# Patient Record
Sex: FEMALE | Race: WHITE | NOT HISPANIC OR LATINO | Employment: FULL TIME | URBAN - METROPOLITAN AREA
[De-identification: names, ages, dates, MRNs, and addresses within clinical notes are randomized per-mention and may not be internally consistent; named-entity substitution may affect disease eponyms.]

---

## 2017-01-31 ENCOUNTER — ALLSCRIPTS OFFICE VISIT (OUTPATIENT)
Dept: OTHER | Facility: OTHER | Age: 27
End: 2017-01-31

## 2017-02-02 LAB
HPV 18 (HISTORICAL): NOT DETECTED
HPV HIGH RISK 16/18 (HISTORICAL): NOT DETECTED
HPV16 (HISTORICAL): NOT DETECTED
PAP (HISTORICAL): NORMAL

## 2018-01-10 NOTE — MISCELLANEOUS
Message   Recorded as Task   Date: 02/01/2016 10:55 AM, Created By: Stefani Connolly   Task Name: Follow Up   Assigned To: Duolingo File   Regarding Patient: Lori Shine, Status: In Progress   CommentSharathe Ava - 01 Feb 2016 10:55 AM     TASK CREATED  Caller: Self; Other; [phone] (Home)  spoke to pt- stated she has colp done D87  on Sat she had a fever took tylenol and is okay now but she is still feeling nauseous wnats to know if that is normal    Please advise  stated you can LM she at work   Maksim Mask - 48 Feb 2016 11:38 AM     TASK [address] - 01 Feb 2016 11:39 AM     TASK EDITED  lmtcb   Maksim Mask - 01 Feb 2016 3:38 PM     TASK EDITED  Pt had colp last Fri  On Sat, she had fever of 101 4, crampy, dizzy and nauseated  She was fine yesterday and slightly crampy today  All other sx gone  No fever  Pt will have menses in 1 week  Pt to observe and cb if sx recur - especially fever        Active Problems    1  Allergic rhinitis (477 9) (J30 9)   2  ASCUS with positive high risk HPV (796 9) (R89 6)   3  Contact dermatitis (692 9) (L25 9)   4  Encounter for birth control pills maintenance (V25 41) (Z30 41)   5  Encounter for gynecological examination without abnormal finding (V72 31) (Z01 419)   6  Need for tuberculosis vaccination (V03 2) (Z23)   7  Normal routine physical examination (V70 0) (Z00 00)   8  Obesity (278 00) (E66 9)   9  Screening for bacterial and spirochetal sexually transmitted diseases (V74 5) (Z11 3)    Current Meds   1  Fluticasone Propionate 0 05 % External Cream; APPLY AND GENTLY MASSAGE INTO   AFFECTED AREA(S) TWICE DAILY; Therapy: 99WDP5974 to (Last Rx:42Gvt1943)  Requested for: 26Zvw2887 Ordered   2  Loryna 3-0 02 MG Oral Tablet; sig 1 po daily; Therapy: 79NXF8251 to (Evaluate:23Jan2017)  Requested for: 92HSY2419; Last   Rx:29Jan2016 Ordered    Allergies    1  Advil CAPS   2   Aspirin TABS    Signatures   Electronically signed by :  Pily Love, ; Feb 1 2016  3:38PM EST                       (Author)

## 2018-01-15 VITALS
SYSTOLIC BLOOD PRESSURE: 130 MMHG | WEIGHT: 234 LBS | BODY MASS INDEX: 43.06 KG/M2 | DIASTOLIC BLOOD PRESSURE: 96 MMHG | HEIGHT: 62 IN

## 2018-01-16 NOTE — PROCEDURES
Assessment    1  ASCUS with positive high risk HPV (796 9) (R89 6)    Plan  ASCUS with positive high risk HPV    · Follow-up visit in 1 year Evaluation and Treatment  Follow-up  Status: Hold For -  Scheduling  Requested for: 76DOF3718   Ordered; For: ASCUS with positive high risk HPV; Ordered By: Yara Perry Performed:  Due: 10ZSJ0456   · Call [phone] if: You are bleeding more heavily than normal during your period, or  you are bleeding between periods ; Status:Complete;   Done: 27XRP5680 09:29AM   Ordered; For:ASCUS with positive high risk HPV; Ordered By:Dat Ingrametia Frees;   · Call [phone] if: You notice abnormal vaginal discharge ; Status:Complete;   Done:  39XLD2114 09:29AM   Ordered; For:ASCUS with positive high risk HPV; Ordered By:Dat Ingrametia Frees;   · Seek Immediate Medical Attention if: Your temperature is higher than 101F ;  Status:Complete;   Done: 94BQX0337 09:29AM   Ordered; For:ASCUS with positive high risk HPV; Ordered By:Dat Ingrametia Frees; Will call with results of biopsies to discuss future management  Return in one year for annual or prn  Active Problems    1  Allergic rhinitis (477 9) (J30 9)   2  Contact dermatitis (692 9) (L25 9)   3  Encounter for birth control pills maintenance (V25 41) (Z30 41)   4  Encounter for gynecological examination without abnormal finding (V72 31) (Z01 419)   5  Need for tuberculosis vaccination (V03 2) (Z23)   6  Normal routine physical examination (V70 0) (Z00 00)   7  Obesity (278 00) (E66 9)   8  Screening for bacterial and spirochetal sexually transmitted diseases (V74 5) (Z11 3)    Current Meds    1  Fluticasone Propionate 0 05 % External Cream; APPLY AND GENTLY MASSAGE INTO   AFFECTED AREA(S) TWICE DAILY; Therapy: 02BIC3619 to (Last Rx:47Zmv4786)  Requested for: 47Msj0467 Ordered    2  Loryna 3-0 02 MG Oral Tablet; sig 1 po daily;    Therapy: 14KJW3313 to (Last Rx:18Nov2014)  Requested for: 97OXU9023 Ordered    Allergies    1  Advil CAPS   2  Aspirin TABS    Procedure    Procedure: colposcopy  Indication: atypical squamous cells of undetermined significance and PCR positive for high risk HPV  Risks, benefits and alternatives were discussed with the patient  We discussed possible complications, including infection and bleeding  Written consent was obtained prior to the procedure  Procedure Note:   A cervical Pap smear was not performed  The squamocolumnar junction was fully visualized  After bathing the cervix in acetic acid, evaluation showed acetowhite changes at 6 o'clock and abnormal bleeding at 9 o'clock  Cervical Biopsy: 2 biopsies taken of the cervix  the biopsies were taken at 6,9 o'clock  Endocervical curettage was performed  Hemostasis was obtained with Monsel's solution and direct pressure  Patient Status: the patient tolerated the procedure well  Complications: there were no complications        Future Appointments    Date/Time Provider Specialty Site   12/19/2016 10:00 AM Pippa Apodaca MD Obstetrics/Gynecology [facility] [address] [place] [address]     Signatures   Electronically signed by : AMINTA Isbell ; Jan 29 2016  9:29AM EST                       (Author)

## 2018-01-31 DIAGNOSIS — Z30.09 BIRTH CONTROL COUNSELING: Primary | ICD-10-CM

## 2018-01-31 RX ORDER — DROSPIRENONE AND ETHINYL ESTRADIOL 0.02-3(28)
1 KIT ORAL DAILY
Qty: 90 TABLET | Refills: 0 | Status: SHIPPED | OUTPATIENT
Start: 2018-01-31 | End: 2018-03-13 | Stop reason: SDUPTHER

## 2018-03-13 ENCOUNTER — OFFICE VISIT (OUTPATIENT)
Dept: OBGYN CLINIC | Facility: CLINIC | Age: 28
End: 2018-03-13
Payer: COMMERCIAL

## 2018-03-13 VITALS
DIASTOLIC BLOOD PRESSURE: 80 MMHG | HEIGHT: 63 IN | SYSTOLIC BLOOD PRESSURE: 124 MMHG | WEIGHT: 240.2 LBS | BODY MASS INDEX: 42.56 KG/M2

## 2018-03-13 DIAGNOSIS — Z12.4 CERVICAL CANCER SCREENING: ICD-10-CM

## 2018-03-13 DIAGNOSIS — Z30.09 BIRTH CONTROL COUNSELING: ICD-10-CM

## 2018-03-13 DIAGNOSIS — Z30.41 SURVEILLANCE OF PREVIOUSLY PRESCRIBED CONTRACEPTIVE PILL: ICD-10-CM

## 2018-03-13 DIAGNOSIS — Z01.419 ENCOUNTER FOR GYNECOLOGICAL EXAMINATION (GENERAL) (ROUTINE) WITHOUT ABNORMAL FINDINGS: Primary | ICD-10-CM

## 2018-03-13 PROBLEM — B97.7 HUMAN PAPILLOMA VIRUS (HPV) INFECTION: Status: ACTIVE | Noted: 2017-01-31

## 2018-03-13 PROBLEM — J45.909 ASTHMA: Status: ACTIVE | Noted: 2017-11-27

## 2018-03-13 PROBLEM — N87.0 DYSPLASIA OF CERVIX, LOW GRADE (CIN 1): Status: ACTIVE | Noted: 2018-03-13

## 2018-03-13 PROCEDURE — S0612 ANNUAL GYNECOLOGICAL EXAMINA: HCPCS | Performed by: NURSE PRACTITIONER

## 2018-03-13 RX ORDER — MONTELUKAST SODIUM 10 MG/1
TABLET ORAL
COMMUNITY

## 2018-03-13 RX ORDER — DROSPIRENONE AND ETHINYL ESTRADIOL 0.02-3(28)
1 KIT ORAL DAILY
Qty: 90 TABLET | Refills: 3 | Status: SHIPPED | OUTPATIENT
Start: 2018-03-13 | End: 2020-02-24 | Stop reason: SDUPTHER

## 2018-03-13 NOTE — ASSESSMENT & PLAN NOTE
Pap hx is as follows:   2013: normal cyto -> 2014: ASCUS, + HR HPV (-16/18) -> 12/2015: ASCUS, + HR HPV (-16/18) ->  1/2016: colpo with LSIL/CIN1, neg dysplasia -> 1/2017 normal cyto, neg HPV  Per ASCCP guidelines, continue age approp testing (repeat cytology in 3 yrs)

## 2018-03-13 NOTE — ASSESSMENT & PLAN NOTE
Normal findings on routine gyn exam  Advised monthly SBE, annual CBE and baseline screening mammo at age 36  Reviewed ASCCP guidelines and pap noted to be up to date  STI testing was offered and the patient declines; she reports low risk, not sexually active since last visit  The patient desires to continue OCP; reviewed ACHES and reasons to call  Diet/activity recommendations  RTO in one year or sooner PRN

## 2018-03-13 NOTE — PROGRESS NOTES
Assessment/Plan:    Dysplasia of cervix, low grade (MAGGI 1)  Pap hx is as follows:   2013: normal cyto -> 2014: ASCUS, + HR HPV (-16/18) -> 12/2015: ASCUS, + HR HPV (-16/18) ->  1/2016: colpo with LSIL/CIN1, neg dysplasia -> 1/2017 normal cyto, neg HPV  Per ASCCP guidelines, continue age approp testing (repeat cytology in 3 yrs)  Encounter for gynecological examination (general) (routine) without abnormal findings  Normal findings on routine gyn exam  Advised monthly SBE, annual CBE and baseline screening mammo at age 36  Reviewed ASCCP guidelines and pap noted to be up to date  STI testing was offered and the patient declines; she reports low risk, not sexually active since last visit  The patient desires to continue OCP; reviewed ACHES and reasons to call  Diet/activity recommendations  RTO in one year or sooner PRN  Diagnoses and all orders for this visit:    Encounter for gynecological examination (general) (routine) without abnormal findings    Cervical cancer screening  -     Liquid-based pap, screening    Surveillance of previously prescribed contraceptive pill    Birth control counseling  -     drospirenone-ethinyl estradiol (JUDY) 3-0 02 MG per tablet; Take 1 tablet by mouth daily    Other orders  -     montelukast (SINGULAIR) 10 mg tablet; Take by mouth  -     VENTOLIN  (90 Base) MCG/ACT inhaler; Inhale 2 puffs every 6 (six) hours as needed for wheezing  -     Cetirizine HCl 10 MG CAPS; Take 10 mg by mouth          Subjective:      Patient ID: Luis Sultana is a 29 y o  female  This patient presents for routine annual gyn exam    She suffers from chronic sinusitis and has plans for sinus polypectomy procedure at [facility]  She continues to struggle with her weight while on/off steroid tapers  H/o abn pap - see A&P  She denies acute gyn complaints  Likes current OCP and desires to continue; denies ACHES   She denies pelvic pain, abn discharge, breast concerns, bowel/bladder dysfunction, depression/anx  Single and not sexually active  Denies STI concerns  Gynecologic Exam         The following portions of the patient's history were reviewed and updated as appropriate: allergies, current medications, past family history, past medical history, past social history, past surgical history and problem list     Review of Systems   Constitutional: Negative  HENT: Positive for congestion and sinus pressure  Negative for dental problem, ear pain and trouble swallowing  Eyes: Negative  Respiratory: Negative  Cardiovascular: Negative  Gastrointestinal: Negative  Endocrine: Negative  Genitourinary: Negative  Musculoskeletal: Negative  Skin: Negative  Allergic/Immunologic: Negative  Neurological: Negative  Hematological: Negative  Psychiatric/Behavioral: Negative  Objective:      /80 (BP Location: Right arm)   Ht 5' 3" (1 6 m)   Wt 109 kg (240 lb 3 2 oz)   LMP 03/03/2018   BMI 42 55 kg/m²          Physical Exam   Constitutional: She is oriented to person, place, and time  She appears well-developed and well-nourished  HENT:   Head: Normocephalic and atraumatic  Eyes: EOM are normal  Pupils are equal, round, and reactive to light  Neck: Normal range of motion  Neck supple  No thyromegaly present  Cardiovascular: Normal rate, regular rhythm and normal heart sounds  Pulmonary/Chest: Effort normal and breath sounds normal  No respiratory distress  She has no wheezes  She has no rales  She exhibits no mass, no tenderness and no deformity  Right breast exhibits no inverted nipple, no mass, no nipple discharge, no skin change and no tenderness  Left breast exhibits no inverted nipple, no mass, no nipple discharge, no skin change and no tenderness  Breasts are symmetrical    Abdominal: Soft  She exhibits no distension and no mass  There is no splenomegaly or hepatomegaly  There is no tenderness  There is no rebound and no guarding  Genitourinary: Rectum normal, vagina normal and uterus normal  No breast swelling, tenderness or discharge  No labial fusion  There is no rash, tenderness, lesion or injury on the right labia  There is no rash, tenderness, lesion or injury on the left labia  Cervix exhibits no motion tenderness, no discharge and no friability  Right adnexum displays no mass, no tenderness and no fullness  Left adnexum displays no mass, no tenderness and no fullness  No erythema, tenderness or bleeding in the vagina  No foreign body in the vagina  No vaginal discharge found  Musculoskeletal: Normal range of motion  Lymphadenopathy:     She has no cervical adenopathy  She has no axillary adenopathy  Neurological: She is alert and oriented to person, place, and time  No cranial nerve deficit  Skin: Skin is warm and dry  No rash noted  No cyanosis  Nails show no clubbing  Psychiatric: She has a normal mood and affect   Her speech is normal and behavior is normal  Judgment and thought content normal  Cognition and memory are normal

## 2018-03-15 ENCOUNTER — TELEPHONE (OUTPATIENT)
Dept: OBGYN CLINIC | Facility: CLINIC | Age: 28
End: 2018-03-15

## 2019-01-03 RX ORDER — PREDNISONE 10 MG/1
TABLET ORAL
Refills: 0 | COMMUNITY
Start: 2018-12-13 | End: 2019-01-08 | Stop reason: ALTCHOICE

## 2019-01-03 RX ORDER — AMOXICILLIN AND CLAVULANATE POTASSIUM 875; 125 MG/1; MG/1
TABLET, FILM COATED ORAL
Refills: 0 | COMMUNITY
Start: 2018-12-13 | End: 2019-01-08 | Stop reason: ALTCHOICE

## 2019-01-03 RX ORDER — SUCRALFATE 1 G/1
1 TABLET ORAL EVERY 12 HOURS
Refills: 1 | COMMUNITY
Start: 2018-10-24 | End: 2019-01-08 | Stop reason: ALTCHOICE

## 2019-01-03 RX ORDER — FLUTICASONE FUROATE AND VILANTEROL TRIFENATATE 100; 25 UG/1; UG/1
1 POWDER RESPIRATORY (INHALATION) DAILY
Refills: 3 | COMMUNITY
Start: 2018-10-09 | End: 2022-08-09 | Stop reason: ALTCHOICE

## 2019-01-08 ENCOUNTER — APPOINTMENT (OUTPATIENT)
Dept: LAB | Facility: HOSPITAL | Age: 29
End: 2019-01-08
Payer: COMMERCIAL

## 2019-01-08 ENCOUNTER — OFFICE VISIT (OUTPATIENT)
Dept: BARIATRICS | Facility: CLINIC | Age: 29
End: 2019-01-08
Payer: COMMERCIAL

## 2019-01-08 VITALS
TEMPERATURE: 98.4 F | BODY MASS INDEX: 43.91 KG/M2 | RESPIRATION RATE: 18 BRPM | DIASTOLIC BLOOD PRESSURE: 74 MMHG | WEIGHT: 238.6 LBS | HEIGHT: 62 IN | HEART RATE: 84 BPM | SYSTOLIC BLOOD PRESSURE: 116 MMHG

## 2019-01-08 DIAGNOSIS — E66.01 OBESITY, CLASS III, BMI 40-49.9 (MORBID OBESITY) (HCC): ICD-10-CM

## 2019-01-08 DIAGNOSIS — R63.5 ABNORMAL WEIGHT GAIN: ICD-10-CM

## 2019-01-08 DIAGNOSIS — J45.909 ASTHMA, UNSPECIFIED ASTHMA SEVERITY, UNSPECIFIED WHETHER COMPLICATED, UNSPECIFIED WHETHER PERSISTENT: Primary | ICD-10-CM

## 2019-01-08 LAB
ALBUMIN SERPL BCP-MCNC: 3.1 G/DL (ref 3.5–5)
ALP SERPL-CCNC: 58 U/L (ref 46–116)
ALT SERPL W P-5'-P-CCNC: 14 U/L (ref 12–78)
ANION GAP SERPL CALCULATED.3IONS-SCNC: 11 MMOL/L (ref 4–13)
AST SERPL W P-5'-P-CCNC: 10 U/L (ref 5–45)
BILIRUB SERPL-MCNC: 0.2 MG/DL (ref 0.2–1)
BUN SERPL-MCNC: 12 MG/DL (ref 5–25)
CALCIUM SERPL-MCNC: 9.1 MG/DL (ref 8.3–10.1)
CHLORIDE SERPL-SCNC: 103 MMOL/L (ref 100–108)
CHOLEST SERPL-MCNC: 201 MG/DL (ref 50–200)
CO2 SERPL-SCNC: 27 MMOL/L (ref 21–32)
CREAT SERPL-MCNC: 0.9 MG/DL (ref 0.6–1.3)
EST. AVERAGE GLUCOSE BLD GHB EST-MCNC: 114 MG/DL
GFR SERPL CREATININE-BSD FRML MDRD: 87 ML/MIN/1.73SQ M
GLUCOSE P FAST SERPL-MCNC: 91 MG/DL (ref 65–99)
HBA1C MFR BLD: 5.6 % (ref 4.2–6.3)
HDLC SERPL-MCNC: 102 MG/DL (ref 40–60)
INSULIN SERPL-ACNC: 18.2 MU/L (ref 3–25)
LDLC SERPL CALC-MCNC: 75 MG/DL (ref 0–100)
NONHDLC SERPL-MCNC: 99 MG/DL
POTASSIUM SERPL-SCNC: 4.1 MMOL/L (ref 3.5–5.3)
PROT SERPL-MCNC: 7.5 G/DL (ref 6.4–8.2)
SODIUM SERPL-SCNC: 141 MMOL/L (ref 136–145)
TRIGL SERPL-MCNC: 121 MG/DL
TSH SERPL DL<=0.05 MIU/L-ACNC: 1.32 UIU/ML (ref 0.36–3.74)

## 2019-01-08 PROCEDURE — 83036 HEMOGLOBIN GLYCOSYLATED A1C: CPT

## 2019-01-08 PROCEDURE — 80053 COMPREHEN METABOLIC PANEL: CPT

## 2019-01-08 PROCEDURE — 36415 COLL VENOUS BLD VENIPUNCTURE: CPT

## 2019-01-08 PROCEDURE — 80061 LIPID PANEL: CPT

## 2019-01-08 PROCEDURE — 83525 ASSAY OF INSULIN: CPT

## 2019-01-08 PROCEDURE — 99203 OFFICE O/P NEW LOW 30 MIN: CPT | Performed by: PHYSICIAN ASSISTANT

## 2019-01-08 PROCEDURE — 84443 ASSAY THYROID STIM HORMONE: CPT

## 2019-01-08 RX ORDER — BUDESONIDE 0.5 MG/2ML
0.5 INHALANT ORAL
COMMUNITY
Start: 2018-09-19 | End: 2022-08-09 | Stop reason: ALTCHOICE

## 2019-01-08 RX ORDER — ASPIRIN 325 MG
650 TABLET ORAL
COMMUNITY

## 2019-01-08 NOTE — ASSESSMENT & PLAN NOTE
-Discussed options of HealthyCORE-Intensive Lifestyle Intervention Program, Very Low Calorie Diet-VLCD, Conservative Program, Ham-En-Y Gastric Bypass and Vertical Sleeve Gastrectomy and the role of weight loss medications   -Initial weight loss goal of 5-10% weight loss for improved health  -Screening labs  Recommend checking lab coverage before having labs drawn   -cmp reviewed from 8/15/18 all within acceptable limits   Needs tsh, lipid, a1c and fasting insulin   - STOP BANG-1/8  -Patient is interested in pursuing Very Low Calorie Diet-VLCD    VLCD Review:  Labs ordered: cmp ordered   HTN meds addressed: n/a  DM2 meds addressed: n/a  VLCD time restriction based on BMI: no

## 2019-01-08 NOTE — PROGRESS NOTES
Assessment/Plan:    Asthma  Taking breo ellipta, ventolin  and pulmicort   -continue management with prescribing provider    Obesity, Class III, BMI 40-49 9 (morbid obesity) ([facility] 75 )  -Discussed options of HealthyCORE-Intensive Lifestyle Intervention Program, Very Low Calorie Diet-VLCD, Conservative Program, Ham-En-Y Gastric Bypass and Vertical Sleeve Gastrectomy and the role of weight loss medications   -Initial weight loss goal of 5-10% weight loss for improved health  -Screening labs  Recommend checking lab coverage before having labs drawn   -cmp reviewed from 8/15/18 all within acceptable limits  Needs tsh, lipid, a1c and fasting insulin   - STOP BANG-1/8  -Patient is interested in pursuing Very Low Calorie Diet-VLCD    VLCD Review:  Labs ordered: cmp ordered   HTN meds addressed: n/a  DM2 meds addressed: n/a  VLCD time restriction based on BMI: no      Follow up vlcd      Diagnoses and all orders for this visit:    Asthma, unspecified asthma severity, unspecified whether complicated, unspecified whether persistent    Obesity, Class III, BMI 40-49 9 (morbid obesity) ([facility] )  -     Lipid panel; Future  -     TSH, 3rd generation with Free T4 reflex; Future  -     Insulin, fasting; Future  -     Hemoglobin A1C; Future  -     Comprehensive metabolic panel; Future    Abnormal weight gain  -     Lipid panel; Future  -     TSH, 3rd generation with Free T4 reflex; Future  -     Insulin, fasting; Future  -     Hemoglobin A1C; Future  -     Comprehensive metabolic panel; Future    Other orders  -     Discontinue: amoxicillin-clavulanate (AUGMENTIN) 875-125 mg per tablet; TAKE 1 TABLET BY MOUTH EVERY 12 HOURS FOR 14 DAYS  TAKE WITH FOOD  -     BREO ELLIPTA 100-25 MCG/INH inhaler; Inhale 1 puff daily  -     Discontinue: predniSONE 10 mg tablet; TAKE 4 TABS X 3 DAYS, THEN 3 TABS X 3 DAYS, THEN 2 TABS X 3 DAYS, THEN 1 TAB EVERYDAY X 3 DAYS  -     Discontinue: sucralfate (CARAFATE) 1 g tablet;  Take 1 g by mouth every 12 (twelve) hours  -     aspirin 325 mg tablet; Take 650 mg by mouth  -     budesonide (PULMICORT) 0 5 mg/2 mL nebulizer solution; Inhale 0 5 mg          Subjective:   Chief Complaint   Patient presents with    Consult     Pt here for MWM consult  Patient ID: Odell Harris  is a 29 y o  female with excess weight/obesity here to pursue weight management  Past Medical History:   Diagnosis Date    Atypical squamous cells of undetermined significance (ASCUS) on Papanicolaou smear of cervix     HPV (human papilloma virus) infection        HPI:  Obesity/Excess Weight:  Severity: Severe  Onset:  Since jarad high/high school   Modifiers: Diet and Exercise, Commercial Weight Loss Programs-ie  Weight Watchers, [employer] Nutrisystem, etc  and Prescription Weight Loss Medications-- phentermine and topamax but didn't feel well on the medications   Contributing factors: Poor Food Choices and Medications-steroids   Associated symptoms: increased joint pain and increased shortness of breath    Goals: under 200lbs, 170-180 lbs   Hydration: 2 liters of seltzer water   Alcohol: none    The following portions of the patient's history were reviewed and updated as appropriate: allergies, current medications, past family history, past medical history, past social history, past surgical history and problem list     Review of Systems   HENT: Positive for sore throat (uri)  Respiratory: Positive for cough (uri) and shortness of breath (with exertion)  Cardiovascular: Negative for chest pain and palpitations  Gastrointestinal: Negative for abdominal pain, constipation, diarrhea, nausea and vomiting  Denies GERD   Endocrine: Positive for heat intolerance  Negative for cold intolerance  Genitourinary: Negative for dysuria  Musculoskeletal: Positive for arthralgias (knees) and back pain  Skin: Negative for rash  Neurological: Negative for headaches     Psychiatric/Behavioral: Negative for suicidal ideas (denies HI)         Denies Depression or anxiety       Objective:    /74 (BP Location: Right arm, Patient Position: Sitting, Cuff Size: Large)   Pulse 84   Temp 98 4 °F (36 9 °C) (Tympanic)   Resp 18   Ht 5' 1 5" (1 562 m)   Wt 108 kg (238 lb 9 6 oz)   BMI 44 35 kg/m²     Physical Exam   Nursing note and vitals reviewed  Constitutional   General appearance: Abnormal   well developed and morbidly obese  Eyes No conjunctival pallor  Ears, Nose, Mouth, and Throat Oral mucosa moist    Pulmonary   Respiratory effort: No increased work of breathing or signs of respiratory distress  Auscultation of lungs: Clear to auscultation, equal breath sounds bilaterally, + wheezes bilaterally, no rales, no rhonci  Cardiovascular   Auscultation of heart: Normal rate and rhythm, normal S1 and S2, without murmurs  Examination of extremities for edema and/or varicosities: Normal   no edema  Abdomen   Abdomen: Abnormal   The abdomen was obese  Bowel sounds were normal  The abdomen was soft and nontender     Musculoskeletal   Gait and station: Normal     Psychiatric   Orientation to person, place and time: Normal     Affect: appropriate

## 2019-01-09 ENCOUNTER — TELEPHONE (OUTPATIENT)
Dept: BARIATRICS | Facility: CLINIC | Age: 29
End: 2019-01-09

## 2019-01-09 NOTE — TELEPHONE ENCOUNTER
----- Message from Silas Sanders PA-C sent at 1/9/2019  8:17 AM EST -----  Please call patient and inform her that her fasting insulin and her A1c is within acceptable range  Ok to proceed with vlcd     Result note: A1C and fasting insulin within normal limits

## 2019-01-09 NOTE — TELEPHONE ENCOUNTER
I telephoned patient and informed her that all her labs are within normal limits, except for her cholesterol (which was elevated), and that RD Rice indicates it is okay for her to proceed with the VLCD  Patient indicated understanding and thanked me

## 2019-01-15 ENCOUNTER — OFFICE VISIT (OUTPATIENT)
Dept: BARIATRICS | Facility: CLINIC | Age: 29
End: 2019-01-15

## 2019-01-15 DIAGNOSIS — R63.5 ABNORMAL WEIGHT GAIN: ICD-10-CM

## 2019-01-15 PROCEDURE — VLCD

## 2019-01-15 PROCEDURE — RECHECK

## 2019-01-18 ENCOUNTER — TELEPHONE (OUTPATIENT)
Dept: BARIATRICS | Facility: CLINIC | Age: 29
End: 2019-01-18

## 2019-01-29 ENCOUNTER — APPOINTMENT (OUTPATIENT)
Dept: LAB | Facility: HOSPITAL | Age: 29
End: 2019-01-29
Payer: COMMERCIAL

## 2019-01-29 ENCOUNTER — TELEPHONE (OUTPATIENT)
Dept: BARIATRICS | Facility: CLINIC | Age: 29
End: 2019-01-29

## 2019-01-29 ENCOUNTER — OFFICE VISIT (OUTPATIENT)
Dept: BARIATRICS | Facility: CLINIC | Age: 29
End: 2019-01-29

## 2019-01-29 VITALS
BODY MASS INDEX: 43.39 KG/M2 | SYSTOLIC BLOOD PRESSURE: 124 MMHG | DIASTOLIC BLOOD PRESSURE: 80 MMHG | HEIGHT: 62 IN | WEIGHT: 235.8 LBS

## 2019-01-29 DIAGNOSIS — R63.5 ABNORMAL WEIGHT GAIN: ICD-10-CM

## 2019-01-29 DIAGNOSIS — R63.5 ABNORMAL WEIGHT GAIN: Primary | ICD-10-CM

## 2019-01-29 LAB
ANION GAP SERPL CALCULATED.3IONS-SCNC: 11 MMOL/L (ref 4–13)
BUN SERPL-MCNC: 11 MG/DL (ref 5–25)
CALCIUM SERPL-MCNC: 9.1 MG/DL (ref 8.3–10.1)
CHLORIDE SERPL-SCNC: 106 MMOL/L (ref 100–108)
CO2 SERPL-SCNC: 27 MMOL/L (ref 21–32)
CREAT SERPL-MCNC: 0.82 MG/DL (ref 0.6–1.3)
GFR SERPL CREATININE-BSD FRML MDRD: 98 ML/MIN/1.73SQ M
GLUCOSE P FAST SERPL-MCNC: 93 MG/DL (ref 65–99)
MAGNESIUM SERPL-MCNC: 2.1 MG/DL (ref 1.6–2.6)
POTASSIUM SERPL-SCNC: 4 MMOL/L (ref 3.5–5.3)
SODIUM SERPL-SCNC: 144 MMOL/L (ref 136–145)

## 2019-01-29 PROCEDURE — 83735 ASSAY OF MAGNESIUM: CPT

## 2019-01-29 PROCEDURE — 36415 COLL VENOUS BLD VENIPUNCTURE: CPT

## 2019-01-29 PROCEDURE — 80048 BASIC METABOLIC PNL TOTAL CA: CPT

## 2019-01-29 PROCEDURE — RECHECK

## 2019-01-29 PROCEDURE — VLCD

## 2019-01-29 NOTE — PROGRESS NOTES
Weight Management Medical Nutrition Assessment  Jose Antonio Mast presented today for her 2 wk f/u on VLCD  Today she weighs 235 8 giving her a loss of 8 3 lbs in the last 2 wks  She reports that she is drinking her 80 oz (sometimes more) of water  She has no constipation and feels great  She went to [facility] for the day and had a shake and a bar - she did have an emergency meal for dinner, but ordered grill chicken on a salad and use oil and vinegar dressing  Overall she is doing very well and plans to continue on VLCD  Labs were ordered and BP taken  Anthropometric Measurements  Start Weight (lbs): 244 1 lbs  Current Weight (lbs): 235 8  TBW % Change from start weight: 1%  Ideal Body Weight (lbs): 107 5 lbs  Goal Weight (lbs):  200 lbs or less    Weight Loss History  Previous weight loss attempts: Self Created Diets (Portion Control, Healthy Food Choices, etc )    Food and Nutrition Related History    Food Recall  Breakfast: Shake   Snack: none  Lunch; pudding   Snack: bar  Dinner: shake   Snack:  Egg beaters       Beverages: water  Volume of beverage intake: 80 oz    Weekends: Same  Cravings: none currently  Trouble area of day:na    Frequency of Eating out: irregularly  Food restrictions:carbs while on VLCD  Cooking: self   Food Shopping: self    Physical Activity Intake  Activity:none  Frequency:none  Physical limitations/barriers to exercise: none    Estimated Needs  Energy    [employer] Energy Needs: BMR : 7302  1-2# loss weekly sedentary:   1094 - 1594        1-2# loss weekly lightly active:  1399 - 1899  Protein: 58 - 73   (1 2-1 5g/kg IBW)  Fluid:   57 oz   (35mL/kg IBW)    Nutrition Diagnosis  Yes; Overweight/obesity  related to Excess energy intake as evidenced by  BMI more than normative standard for age and sex (obesity-grade II 35-39  9)       Nutrition Intervention    Nutrition Prescription  Calories:  800 calories  Protein:  96 gr  Fluid:  80 oz    Meal Plan  Breakfast: Shake  Snack:  Lunch:  pudding  Snack: bar  Dinner:  Shake or pudding  Snack:    Nutrition Education:    VLCD guide lines  Calorie controlled menu  Lean protein food choices  Healthy snack options        Nutrition Counseling:  Strategies: meal planning, portion sizes, healthy snack choices, hydration, fiber intake, protein intake, exercise, food journal      Monitoring and Evaluation:  Evaluation criteria:  Energy Intake  Meet protein needs  Maintain adequate hydration  Monitor weekly weight    Barriers to learning:none  Readiness to change: Action  Comprehension: good  Expected Compliance: good

## 2019-01-29 NOTE — TELEPHONE ENCOUNTER
Relayed information to patient    She expressed understanding and will proceed w/VLCD       ----- Message from Ana Luisa Jarvis PA-C sent at 1/29/2019 11:49 AM EST -----  Please call patient and inform her that her bmp and magnesium are within normal limits ok to proceed with vlcd    Result note: bmp and magnesium within acceptable limits

## 2019-02-12 ENCOUNTER — OFFICE VISIT (OUTPATIENT)
Dept: BARIATRICS | Facility: CLINIC | Age: 29
End: 2019-02-12

## 2019-02-12 ENCOUNTER — APPOINTMENT (OUTPATIENT)
Dept: LAB | Facility: HOSPITAL | Age: 29
End: 2019-02-12
Payer: COMMERCIAL

## 2019-02-12 VITALS
DIASTOLIC BLOOD PRESSURE: 74 MMHG | SYSTOLIC BLOOD PRESSURE: 116 MMHG | HEIGHT: 62 IN | WEIGHT: 227.7 LBS | BODY MASS INDEX: 41.9 KG/M2

## 2019-02-12 DIAGNOSIS — R63.5 ABNORMAL WEIGHT GAIN: ICD-10-CM

## 2019-02-12 DIAGNOSIS — R63.5 ABNORMAL WEIGHT GAIN: Primary | ICD-10-CM

## 2019-02-12 LAB
ANION GAP SERPL CALCULATED.3IONS-SCNC: 9 MMOL/L (ref 4–13)
BUN SERPL-MCNC: 16 MG/DL (ref 5–25)
CALCIUM SERPL-MCNC: 9.6 MG/DL (ref 8.3–10.1)
CHLORIDE SERPL-SCNC: 104 MMOL/L (ref 100–108)
CO2 SERPL-SCNC: 26 MMOL/L (ref 21–32)
CREAT SERPL-MCNC: 0.97 MG/DL (ref 0.6–1.3)
GFR SERPL CREATININE-BSD FRML MDRD: 80 ML/MIN/1.73SQ M
GLUCOSE P FAST SERPL-MCNC: 98 MG/DL (ref 65–99)
MAGNESIUM SERPL-MCNC: 2 MG/DL (ref 1.6–2.6)
POTASSIUM SERPL-SCNC: 4.6 MMOL/L (ref 3.5–5.3)
SODIUM SERPL-SCNC: 139 MMOL/L (ref 136–145)

## 2019-02-12 PROCEDURE — RECHECK

## 2019-02-12 PROCEDURE — 80048 BASIC METABOLIC PNL TOTAL CA: CPT

## 2019-02-12 PROCEDURE — 83735 ASSAY OF MAGNESIUM: CPT

## 2019-02-12 PROCEDURE — 36415 COLL VENOUS BLD VENIPUNCTURE: CPT

## 2019-02-12 PROCEDURE — VLCD

## 2019-02-12 NOTE — PROGRESS NOTES
Weight Management Medical Nutrition Assessment  Candelaria presented today for her 4 wk f/u on VLCD  Today she weighs 227 6 lbs giving her a loss of 8 6 lbs in the last 2 wks  She continues to drink her 80 oz (sometimes more) of water  She reports that she continues to feel but is experiencing a little constipation  reviewed fiber supplements and she did purchase some meal replacements that have fiber  She did have one emergency meal, which was 4 oz of steak and steamed broccoli  She did not get her labs done yet  Stressed the importance of the labs and  following the program   She plans to go get them this morning after this appointments  BP taken - 116/74      Anthropometric Measurements  Start Weight (lbs): 244 1 lbs  Current Weight (lbs): 226 6 lbs  TBW % Change from start weight: 5%  Ideal Body Weight (lbs): 107 5 lbs  Goal Weight (lbs):  200 lbs or less     Weight Loss History  Previous weight loss attempts: Self Created Diets (Portion Control, Healthy Food Choices, etc )     Food and Nutrition Related History     Food Recall  Breakfast: Shake          Snack: none  Lunch; pudding   Snack: bar  Dinner: shake   Snack:  Egg beaters or hard boiled eggs        Beverages: water  Volume of beverage intake: 80 oz     Weekends: Same  Cravings: none currently  Trouble area of day:na     Frequency of Eating out: irregularly  Food restrictions:carbs while on VLCD  Cooking: self   Food Shopping: self     Physical Activity Intake  Activity:none  Frequency:none  Physical limitations/barriers to exercise: none     Estimated Needs  Energy     [employer] [place] Energy Needs: BMR : 4264  1-2# loss weekly sedentary:   5592 - 1777      1-2# loss weekly lightly active:  0706 - 4488  Protein: 58 - 73   (1 2-1 5g/kg IBW)  Fluid:   57 oz   (35mL/kg IBW)     Nutrition Diagnosis  Yes;     Overweight/obesity  related to Excess energy intake as evidenced by  BMI more than normative standard for age and sex (obesity-grade II 35-39  9)     Nutrition Intervention     Nutrition Prescription  Calories:  800 calories  Protein:  96 gr  Fluid:  80 oz     Meal Plan  Breakfast:  Shake  Snack:  Lunch:  pudding  Snack: bar  Dinner:  Shake or pudding  Snack:     Nutrition Education:    VLCD guide lines  Calorie controlled menu  Lean protein food choices  Healthy snack options           Nutrition Counseling:  Strategies: meal planning, portion sizes, healthy snack choices, hydration, fiber intake, protein intake, exercise, food journal        Monitoring and Evaluation:  Evaluation criteria:  Energy Intake  Meet protein needs  Maintain adequate hydration  Monitor weekly weight     Barriers to learning:none  Readiness to change: Action  Comprehension: good  Expected Compliance: good

## 2019-02-14 ENCOUNTER — TELEPHONE (OUTPATIENT)
Dept: BARIATRICS | Facility: CLINIC | Age: 29
End: 2019-02-14

## 2019-02-14 NOTE — TELEPHONE ENCOUNTER
I left a brief message on patient's voice mail that her lab results were within normal limits and she could continue w/VLCD       ----- Message from Ana Luisa Jarvis PA-C sent at 2/13/2019  8:38 AM EST -----  Please call cesar and inform her that her bmp and magnesium are within normal values   Ok to continue with vlcd    Result note: magnesium and bmp within normal range

## 2019-02-26 ENCOUNTER — OFFICE VISIT (OUTPATIENT)
Dept: BARIATRICS | Facility: CLINIC | Age: 29
End: 2019-02-26

## 2019-02-26 VITALS
BODY MASS INDEX: 41.44 KG/M2 | WEIGHT: 225.2 LBS | HEIGHT: 62 IN | DIASTOLIC BLOOD PRESSURE: 78 MMHG | SYSTOLIC BLOOD PRESSURE: 128 MMHG

## 2019-02-26 DIAGNOSIS — R63.5 ABNORMAL WEIGHT GAIN: ICD-10-CM

## 2019-02-26 PROCEDURE — VLCD

## 2019-02-26 PROCEDURE — RECHECK

## 2019-02-26 NOTE — PROGRESS NOTES
Weight Management Medical Nutrition Assessment  Autumn Monzon presented today for her 6 wk f/u on VLCD   Today she weighs 225 2 lbs giving her a loss of 2 4 lbs in the last 2 wks  She continues to drink her 80 oz (sometimes more) of water   She admits that this time she did go off VLCD a little and did eat some real food  She plans to switch to a partial meal plan moving forward and will most likely purchase 6 RD visit Bundle  She feels she needs to stay accountable by coming every 2 - 3 weeks  Anthropometric Measurements  Start Weight (lbs): 244 1 lbs  Current Weight (lbs): 225 2lbs  TBW % Change from start weight: 6%  Ideal Body Weight (lbs): 107 5 lbs  Goal Weight (lbs):  200 lbs or less     Weight Loss History  Previous weight loss attempts: Self Created Diets (Portion Control, Healthy Food Choices, etc )     Food and Nutrition Related History     Food Recall  Breakfast: MSUKS          Snack: none  Lunch; pudding   Snack: bar  Dinner: salad and steak   Snack:  Egg beaters or hard boiled eggs        Beverages: water  Volume of beverage intake: 80 oz     Weekends: Same  Cravings: none currently  Trouble area of day:na     Frequency of Eating out: irregularly  Food restrictions:carbs while on VLCD  Cooking: self   Food Shopping: self     Physical Activity Intake  Activity:none  Frequency:none  Physical limitations/barriers to exercise: none     Estimated Needs  Energy     [employer] Energy Needs: BMR : 2689  8-6# loss weekly sedentary:   8881- 4494      6-7# loss weekly lightly active:  1793 - 0654  Protein: 58 - 73   (1 2-1 5g/kg IBW)  Fluid:   57 oz   (35mL/kg IBW)     Nutrition Diagnosis  Yes;    Overweight/obesity  related to Excess energy intake as evidenced by  BMI more than normative standard for age and sex (obesity-grade II 35-39  9)     Nutrition Intervention     Nutrition Prescription  Calories:  1000 calories  Protein:  68 gr  Fluid:  80 oz     Meal Plan  Breakfast:  Shake  Snack:  Lunch:  pudding  Snack: bar  Dinner:  Sensible dinner   Snack: low carb snack     Nutrition Education:    VLCD guide lines  Calorie controlled menu  Lean protein food choices  Healthy snack options           Nutrition Counseling:  Strategies: meal planning, portion sizes, healthy snack choices, hydration, fiber intake, protein intake, exercise, food journal        Monitoring and Evaluation:  Evaluation criteria:  Energy Intake  Meet protein needs  Maintain adequate hydration  Monitor weekly weight     Barriers to learning:none  Readiness to change: Action  Comprehension: good  Expected Compliance: good

## 2019-03-12 ENCOUNTER — OFFICE VISIT (OUTPATIENT)
Dept: BARIATRICS | Facility: CLINIC | Age: 29
End: 2019-03-12

## 2019-03-12 VITALS — BODY MASS INDEX: 41.26 KG/M2 | HEIGHT: 62 IN | WEIGHT: 224.2 LBS

## 2019-03-12 DIAGNOSIS — R63.5 ABNORMAL WEIGHT GAIN: ICD-10-CM

## 2019-03-12 PROCEDURE — RECHECK

## 2019-03-12 PROCEDURE — DB3PK

## 2019-03-19 ENCOUNTER — ANNUAL EXAM (OUTPATIENT)
Dept: OBGYN CLINIC | Facility: CLINIC | Age: 29
End: 2019-03-19
Payer: COMMERCIAL

## 2019-03-19 VITALS — SYSTOLIC BLOOD PRESSURE: 128 MMHG | DIASTOLIC BLOOD PRESSURE: 72 MMHG | WEIGHT: 222 LBS | BODY MASS INDEX: 41.27 KG/M2

## 2019-03-19 DIAGNOSIS — Z30.41 SURVEILLANCE OF PREVIOUSLY PRESCRIBED CONTRACEPTIVE PILL: ICD-10-CM

## 2019-03-19 DIAGNOSIS — Z01.419 ENCOUNTER FOR GYNECOLOGICAL EXAMINATION (GENERAL) (ROUTINE) WITHOUT ABNORMAL FINDINGS: Primary | ICD-10-CM

## 2019-03-19 PROCEDURE — S0612 ANNUAL GYNECOLOGICAL EXAMINA: HCPCS | Performed by: NURSE PRACTITIONER

## 2019-03-19 NOTE — ASSESSMENT & PLAN NOTE
Normal findings on routine gyn exam  Advised monthly SBE, annual CBE and baseline screening mammo at age 36  Reviewed ASCCP guidelines and recommended pap with cotesting at age 27  STI testing was offered and the patient declines; she reports low risk  The patient desires to continue current contraceptive method (OCP)  Diet/activity recommendations reviewed  RTO in one year or sooner PRN

## 2019-03-19 NOTE — ASSESSMENT & PLAN NOTE
Stable at this time  Advised the patient that she may call if steroid therapy is required in the future, as she is prone to candida

## 2019-03-25 NOTE — PROGRESS NOTES
Assessment/Plan:    Encounter for gynecological examination (general) (routine) without abnormal findings  Normal findings on routine gyn exam  Advised monthly SBE, annual CBE and baseline screening mammo at age 36  Reviewed ASCCP guidelines and recommended pap with cotesting at age 27  STI testing was offered and the patient declines; she reports low risk  The patient desires to continue current contraceptive method (OCP)  Diet/activity recommendations reviewed  RTO in one year or sooner PRN  Surveillance of previously prescribed contraceptive pill  The patient likes current OCP  She denies ACHES and desires to continue  She is aware condoms are advised with all sexual contact for prevention of STI  Refill provided  Reviewed reasons to call  Chronic ethmoidal sinusitis  Stable at this time  Advised the patient that she may call if steroid therapy is required in the future, as she is prone to candida  Diagnoses and all orders for this visit:    Surveillance of previously prescribed contraceptive pill    Encounter for gynecological examination (general) (routine) without abnormal findings          Subjective:      Patient ID: Rika Gonzalez is a 34 y o  female  This patient presents for routine annual gyn exam    She denies acute gyn complaints  Menses are regular and light to mod  Likes OCP, denies ACHES and desires to continue  She denies pelvic pain, breast concerns, abnormal discharge, bowel/bladder dysfunction, depression/anxiety  Monogamous  She denies STI concerns  The following portions of the patient's history were reviewed and updated as appropriate: allergies, current medications, past family history, past medical history, past social history, past surgical history and problem list     Review of Systems   Constitutional: Negative  HENT: Negative  Eyes: Negative  Respiratory: Negative  Cardiovascular: Negative  Gastrointestinal: Negative      Endocrine: Negative  Genitourinary: Negative  Musculoskeletal: Negative  Skin: Negative  Allergic/Immunologic: Negative  Neurological: Negative  Hematological: Negative  Psychiatric/Behavioral: Negative  Objective:      /72   Wt 101 kg (222 lb)   LMP 02/24/2019   BMI 41 27 kg/m²          Physical Exam   Constitutional: She is oriented to person, place, and time  She appears well-developed and well-nourished  HENT:   Head: Normocephalic and atraumatic  Eyes: Pupils are equal, round, and reactive to light  EOM are normal    Neck: Normal range of motion  Neck supple  No thyromegaly present  Cardiovascular: Normal rate, regular rhythm and normal heart sounds  Pulmonary/Chest: Effort normal and breath sounds normal  No respiratory distress  She has no wheezes  She has no rales  She exhibits no mass, no tenderness and no deformity  Right breast exhibits no inverted nipple, no mass, no nipple discharge, no skin change and no tenderness  Left breast exhibits no inverted nipple, no mass, no nipple discharge, no skin change and no tenderness  No breast tenderness or discharge  Breasts are symmetrical    Abdominal: Soft  She exhibits no distension and no mass  There is no splenomegaly or hepatomegaly  There is no tenderness  There is no rebound and no guarding  Genitourinary: Rectum normal, vagina normal and uterus normal  No breast tenderness or discharge  No labial fusion  There is no rash, tenderness, lesion or injury on the right labia  There is no rash, tenderness, lesion or injury on the left labia  Cervix exhibits no motion tenderness, no discharge and no friability  Right adnexum displays no mass, no tenderness and no fullness  Left adnexum displays no mass, no tenderness and no fullness  No erythema, tenderness or bleeding in the vagina  No foreign body in the vagina  No vaginal discharge found  Musculoskeletal: Normal range of motion     Lymphadenopathy:     She has no cervical adenopathy  She has no axillary adenopathy  Neurological: She is alert and oriented to person, place, and time  No cranial nerve deficit  Skin: Skin is warm and dry  No rash noted  No cyanosis  Nails show no clubbing  Psychiatric: She has a normal mood and affect   Her speech is normal and behavior is normal  Judgment and thought content normal  Cognition and memory are normal

## 2019-03-27 ENCOUNTER — TELEPHONE (OUTPATIENT)
Dept: OBGYN CLINIC | Facility: CLINIC | Age: 29
End: 2019-03-27

## 2019-03-27 DIAGNOSIS — Z30.09 BIRTH CONTROL COUNSELING: ICD-10-CM

## 2019-04-09 ENCOUNTER — OFFICE VISIT (OUTPATIENT)
Dept: BARIATRICS | Facility: CLINIC | Age: 29
End: 2019-04-09

## 2019-04-09 VITALS — HEIGHT: 62 IN | BODY MASS INDEX: 41.27 KG/M2

## 2019-04-09 DIAGNOSIS — R63.5 ABNORMAL WEIGHT GAIN: Primary | ICD-10-CM

## 2019-04-09 PROCEDURE — RECHECK

## 2019-05-07 ENCOUNTER — OFFICE VISIT (OUTPATIENT)
Dept: BARIATRICS | Facility: CLINIC | Age: 29
End: 2019-05-07

## 2019-05-07 VITALS — HEIGHT: 62 IN | WEIGHT: 218.4 LBS | BODY MASS INDEX: 40.19 KG/M2

## 2019-05-07 DIAGNOSIS — R63.5 ABNORMAL WEIGHT GAIN: Primary | ICD-10-CM

## 2019-05-07 PROCEDURE — RECHECK

## 2019-06-11 ENCOUNTER — OFFICE VISIT (OUTPATIENT)
Dept: BARIATRICS | Facility: CLINIC | Age: 29
End: 2019-06-11

## 2019-06-11 VITALS — HEIGHT: 62 IN | WEIGHT: 217 LBS | BODY MASS INDEX: 39.93 KG/M2

## 2019-06-11 DIAGNOSIS — R63.5 ABNORMAL WEIGHT GAIN: ICD-10-CM

## 2019-06-11 PROCEDURE — DB3PK

## 2019-06-11 PROCEDURE — RECHECK

## 2019-06-25 ENCOUNTER — TELEPHONE (OUTPATIENT)
Dept: BARIATRICS | Facility: CLINIC | Age: 29
End: 2019-06-25

## 2019-07-30 ENCOUNTER — OFFICE VISIT (OUTPATIENT)
Dept: BARIATRICS | Facility: CLINIC | Age: 29
End: 2019-07-30

## 2019-07-30 VITALS — HEIGHT: 62 IN | BODY MASS INDEX: 40.52 KG/M2 | WEIGHT: 220.2 LBS

## 2019-07-30 DIAGNOSIS — R63.5 ABNORMAL WEIGHT GAIN: Primary | ICD-10-CM

## 2019-07-30 PROCEDURE — RECHECK

## 2019-07-30 NOTE — PROGRESS NOTES
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  Unfortunately she has been skipping meals on work days and then at night will over eat  We discussed taking a cooler of foods that she could easily eat in between clients - things like grapes and string cheese, nuts, protein bars or shakes  She also has stopped walking but we discussed that because of her schedule  I suggested that she walk on her shorter work days, and if she does not have time to do a full 45 minute walk, go for 15 minutes but she should try to fit something in  Anthropometric Measurements  Start Weight (lbs): 244 1 lbs  Current Weight (lbs): 220 2 lbs  TBW % Change from start weight:  8%  Ideal Body Weight (lbs): 107 5 lbs  Goal Weight (lbs):  200 lbs or less     Weight Loss History  Previous weight loss attempts: Self Created Diets (Portion Control, Healthy Food Choices, etc )     Food and Nutrition Related History     Food Recall  Breakfast: shake   Snack: none  Lunch: skipping because of shcedule  Snack: none  Dinner: pizza  Snack: chips, ice cream        Beverages: water  Volume of beverage intake: 80 oz     Weekends: Same  Cravings: none currently  Trouble area of day:na     Frequency of Eating out: irregularly  (she is making smarter choices when she does eat out)  Food restrictions: none  Cooking: self   Food Shopping: self     Physical Activity Intake  Activity: stationary bike and walking outside, hand weights  Frequency: 3 -times per week  Physical limitations/barriers to exercise: none     Estimated Needs  Energy     [employer] Energy Needs:  BMR : 1344   1-2# loss weekly sedentary:   6293 - 5983    9-4# loss weekly lightly active:  0663 - 3724  Protein: 58 - 73   (1 2-1 5g/kg IBW)  Fluid:   57 oz   (35mL/kg IBW)     Nutrition Diagnosis  Yes;    Overweight/obesity  related to Excess energy intake as evidenced by  BMI more than normative standard for age and sex (obesity-grade II 35-39  9)     Nutrition Intervention     Nutrition Prescription  Calories:  1000 calories  (1200 if exercise day)  Protein:  68 gr  Fluid:  80 oz     Meal Plan  Breakfast:  Shake  Snack: protein (ie   String cheese, nuts)  Lunch:  pudding  Snack: bar  Dinner:  Sensible dinner   Snack: none     Nutrition Education:    Partial meal plan  Calorie controlled menu  Lean protein food choices  Healthy snack options           Nutrition Counseling:  Strategies: meal planning, portion sizes, healthy snack choices, hydration, fiber intake, protein intake, exercise, food journal        Monitoring and Evaluation:  Evaluation criteria:  Energy Intake  Meet protein needs  Maintain adequate hydration  Monitor weekly weight     Barriers to learning:none  Readiness to change: Action  Comprehension: good  Expected Compliance: good

## 2019-09-09 NOTE — PROGRESS NOTES
Weight Management Medical Nutrition Assessment  Carmel is here today for 3/3 RD bundle visit   Today she weighs 224 0 lbs giving her a gain of 3 8 lbs in the last 4 wks  She continues to struggle and is still off track and she just finished with a round of prednisone  She is not currently exercising or her tracking her food  We discussed the importance of exercising to aid in weight loss, but also its importance in her overall health  She is interested in starting VLCD again but cannot safely start it again until January  In the meantime will continue as a partial meal plan and will make a follow-up appointment in December with RD Rice  Anthropometric Measurements  Start Weight (lbs): 244 1 lbs  Current Weight (lbs): 224 0 lbs  TBW % Change from start weight:  6%  Ideal Body Weight (lbs): 107 5 lbs  Goal Weight (lbs):  200 lbs or less     Weight Loss History  Previous weight loss attempts: Self Created Diets (Portion Control, Healthy Food Choices, etc )     Food and Nutrition Related History     Food Recall  Breakfast: shake   Snack: none  Lunch: turkey roll-ups with cheese  Snack: none  Dinner: steak with butternut squash  Snack: baked chips, bean dip      Beverages: water  Volume of beverage intake: 80 oz     Weekends: Same  Cravings: none currently  Trouble area of day:na     Frequency of Eating out: irregularly  (she is making smarter choices when she does eat out)  Food restrictions: none  Cooking: self   Food Shopping: self     Physical Activity Intake  Activity: none currently  Frequency: none currently  Physical limitations/barriers to exercise: none     Estimated Needs  Energy     [employer] Energy Needs:  BMR : 5294   1-2# loss weekly sedentary:   9987 - 9864   1-2# loss weekly lightly active:  8522 - 8972  Protein: 58 - 73   (1 2-1 5g/kg IBW)  Fluid:   57 oz   (35mL/kg IBW)     Nutrition Diagnosis  Yes;    Overweight/obesity  related to Excess energy intake as evidenced by  BMI more than normative standard for age and sex (obesity-grade II 35-39  9)     Nutrition Intervention     Nutrition Prescription  Calories:  1000 calories  (1200 if exercise day)  Protein:  68 gr  Fluid:  80 oz     Meal Plan  Breakfast:  Shake  Snack: protein (ie   String cheese, nuts)  Lunch:  pudding  Snack: bar  Dinner:  Sensible dinner   Snack: none     Nutrition Education:    Partial meal plan  Calorie controlled menu  Lean protein food choices  Healthy snack options           Nutrition Counseling:  Strategies: meal planning, portion sizes, healthy snack choices, hydration, fiber intake, protein intake, exercise, food journal        Monitoring and Evaluation:  Evaluation criteria:  Energy Intake  Meet protein needs  Maintain adequate hydration  Monitor weekly weight     Barriers to learning:none  Readiness to change: Action  Comprehension: good  Expected Compliance: good

## 2019-09-10 ENCOUNTER — OFFICE VISIT (OUTPATIENT)
Dept: BARIATRICS | Facility: CLINIC | Age: 29
End: 2019-09-10

## 2019-09-10 VITALS — BODY MASS INDEX: 41.22 KG/M2 | WEIGHT: 224 LBS | HEIGHT: 62 IN

## 2019-09-10 DIAGNOSIS — R63.5 ABNORMAL WEIGHT GAIN: Primary | ICD-10-CM

## 2019-09-10 PROCEDURE — RECHECK

## 2019-10-29 ENCOUNTER — OFFICE VISIT (OUTPATIENT)
Dept: BARIATRICS | Facility: CLINIC | Age: 29
End: 2019-10-29

## 2019-10-29 VITALS — WEIGHT: 218.6 LBS | BODY MASS INDEX: 40.23 KG/M2 | HEIGHT: 62 IN

## 2019-10-29 DIAGNOSIS — R63.5 ABNORMAL WEIGHT GAIN: ICD-10-CM

## 2019-10-29 PROCEDURE — RECHECK

## 2019-10-29 PROCEDURE — WMDI30

## 2019-10-29 NOTE — PROGRESS NOTES
Weight Management Medical Nutrition Assessment  Carmel is here today for meal planning   Today she weighs 218 6 lbs giving her a loss of 5 4 lbs in the last 4 wks   Since the last visit, she has been starting to track her food and is being more mindful of what she is eating  She recently renewed her gym membership and went 3 times last week  She will be starting to work with a  this week and will see him 2x per week to start      Anthropometric Measurements  Start Weight (lbs): 244 1 lbs  Current Weight (lbs): 218 6 lbs  TBW % Change from start weight:  8%  Ideal Body Weight (lbs): 107 5 lbs  Goal Weight (lbs):  200 lbs or less     Weight Loss History  Previous weight loss attempts: Self Created Diets (Portion Control, Healthy Food Choices, etc )     Food and Nutrition Related History     Food Recall  Breakfast: shake   Snack: none  Lunch: turkey roll-ups with cheese  Snack: none  Dinner: steak with butternut squash  Snack: baked chips, bean dip      Beverages: water  Volume of beverage intake: 80 oz     Weekends: Same  Cravings: none currently  Trouble area of day:na     Frequency of Eating out: irregularly  (she is making smarter choices when she does eat out)  Food restrictions: none  Cooking: self   Food Shopping: self     Physical Activity Intake  Activity: none currently  Frequency: none currently  Physical limitations/barriers to exercise: none     Estimated Needs  Energy     [employer] [place] Energy Needs: BMR : 1214   8-3# loss weekly sedentary:   811 - 1494  1-2# loss weekly lightly active:  7830 - 8504  Protein: 58 - 73   (1 2-1 5g/kg IBW)  Fluid:   57 oz   (35mL/kg IBW)     Nutrition Diagnosis  Yes;    Overweight/obesity  related to Excess energy intake as evidenced by  BMI more than normative standard for age and sex (obesity-grade II 35-39  9)     Nutrition Intervention     Nutrition Prescription  Calories:  1000 calories  (1200 if exercise day)  Protein:  68 gr  Fluid:  80 oz     Meal Plan  Breakfast:  Shake  Snack: protein (ie   String cheese, nuts)  Lunch:  pudding  Snack: bar  Dinner:  Sensible dinner   Snack: none     Nutrition Education:    Partial meal plan  Calorie controlled menu  Lean protein food choices  Healthy snack options           Nutrition Counseling:  Strategies: meal planning, portion sizes, healthy snack choices, hydration, fiber intake, protein intake, exercise, food journal        Monitoring and Evaluation:  Evaluation criteria:  Energy Intake  Meet protein needs  Maintain adequate hydration  Monitor weekly weight     Barriers to learning:none  Readiness to change: Action  Comprehension: good  Expected Compliance: good

## 2019-12-02 ENCOUNTER — APPOINTMENT (OUTPATIENT)
Dept: LAB | Facility: HOSPITAL | Age: 29
End: 2019-12-02
Payer: COMMERCIAL

## 2019-12-02 ENCOUNTER — TRANSCRIBE ORDERS (OUTPATIENT)
Dept: ADMINISTRATIVE | Facility: HOSPITAL | Age: 29
End: 2019-12-02

## 2019-12-02 DIAGNOSIS — Z51.81 ENCOUNTER FOR THERAPEUTIC DRUG MONITORING: Primary | ICD-10-CM

## 2019-12-02 DIAGNOSIS — Z51.81 ENCOUNTER FOR THERAPEUTIC DRUG MONITORING: ICD-10-CM

## 2019-12-02 LAB
ALBUMIN SERPL BCP-MCNC: 3.5 G/DL (ref 3.5–5)
ALP SERPL-CCNC: 42 U/L (ref 46–116)
ALT SERPL W P-5'-P-CCNC: 20 U/L (ref 12–78)
AST SERPL W P-5'-P-CCNC: 17 U/L (ref 5–45)
BILIRUB DIRECT SERPL-MCNC: 0.06 MG/DL (ref 0–0.2)
BILIRUB SERPL-MCNC: 0.2 MG/DL (ref 0.2–1)
PROT SERPL-MCNC: 7.4 G/DL (ref 6.4–8.2)

## 2019-12-02 PROCEDURE — 80076 HEPATIC FUNCTION PANEL: CPT

## 2019-12-02 PROCEDURE — 36415 COLL VENOUS BLD VENIPUNCTURE: CPT

## 2020-01-13 ENCOUNTER — TELEPHONE (OUTPATIENT)
Dept: BARIATRICS | Facility: CLINIC | Age: 30
End: 2020-01-13

## 2020-01-13 NOTE — TELEPHONE ENCOUNTER
Called patient to offer an earlier appointment after a cancellation  Patient had requested an earlier appointment  Left message, waiting for her to call back

## 2020-01-14 ENCOUNTER — OFFICE VISIT (OUTPATIENT)
Dept: BARIATRICS | Facility: CLINIC | Age: 30
End: 2020-01-14

## 2020-01-14 DIAGNOSIS — R63.5 ABNORMAL WEIGHT GAIN: ICD-10-CM

## 2020-01-14 PROCEDURE — RECHECK

## 2020-01-14 PROCEDURE — DB3PK

## 2020-01-14 NOTE — PROGRESS NOTES
Weight Management Medical Nutrition Assessment  Carmel is here today for 1/3 RD visits   Today she weighs 203 8 lbs giving her a loss of 14 8 lbs since October  She is weighing and measuring everything she eats  She has cut out sugary snacks and junk food  She is working with a  2 times a week and works out on her own 3 -4 days  She is very conscious of what she is eating and drinking       Anthropometric Measurements  Start Weight (lbs): 244 1 lbs  Current Weight (lbs): 203 8 lbs  TBW % Change from start weight:  8%  Ideal Body Weight (lbs): 107 5 lbs  Goal Weight (lbs):  200 lbs or less     Weight Loss History  Previous weight loss attempts: Self Created Diets (Portion Control, Healthy Food Choices, etc )     Food and Nutrition Related History     Food Recall  Breakfast: eggs with veg, protien waffle  Snack: bar  Lunch: premier shake  Snack: none  Dinner: 1/2 cup rice  4 oz steak, vegetable  Snack:       Beverages: water  Volume of beverage intake: 80 oz     Weekends: Same  Cravings: none currently  Trouble area of day:na     Frequency of Eating out: irregularly  (she is making smarter choices when she does eat out)  Food restrictions: none  Cooking: self   Food Shopping: self     Physical Activity Intake  Activity: cristina and cardio  Frequency: 5 - 6 days  Physical limitations/barriers to exercise: none     Estimated Needs  Energy     [employer] [place] Energy Needs: BMR : 0230   3-2# loss weekly sedentary:   106 - 1494  1-2# loss weekly lightly active:  8623 - 3709  Protein: 58 - 73   (1 2-1 5g/kg IBW)  Fluid:   57 oz   (35mL/kg IBW)     Nutrition Diagnosis  Yes;    Overweight/obesity  related to Excess energy intake as evidenced by  BMI more than normative standard for age and sex (obesity-grade II 35-39  9)     Nutrition Intervention     Nutrition Prescription  Calories:  1000 calories  (1200 if exercise day)  Protein:  68 gr  Fluid:  80 oz     Meal Plan  Breakfast:  Shake  Snack: protein (ie  String cheese, nuts)  Lunch:  pudding  Snack: bar  Dinner:  Sensible dinner   Snack: none     Nutrition Education:    Partial meal plan  Calorie controlled menu  Lean protein food choices  Healthy snack options           Nutrition Counseling:  Strategies: meal planning, portion sizes, healthy snack choices, hydration, fiber intake, protein intake, exercise, food journal        Monitoring and Evaluation:  Evaluation criteria:  Energy Intake  Meet protein needs  Maintain adequate hydration  Monitor weekly weight     Barriers to learning:none  Readiness to change: Action  Comprehension: good  Expected Compliance: good

## 2020-02-24 DIAGNOSIS — Z30.09 BIRTH CONTROL COUNSELING: ICD-10-CM

## 2020-02-24 RX ORDER — DROSPIRENONE AND ETHINYL ESTRADIOL 0.02-3(28)
1 KIT ORAL DAILY
Qty: 28 TABLET | Refills: 0 | Status: SHIPPED | OUTPATIENT
Start: 2020-02-24 | End: 2020-03-16

## 2020-03-16 DIAGNOSIS — Z30.09 BIRTH CONTROL COUNSELING: ICD-10-CM

## 2020-03-23 ENCOUNTER — TELEPHONE (OUTPATIENT)
Dept: OBGYN CLINIC | Facility: CLINIC | Age: 30
End: 2020-03-23

## 2020-03-27 ENCOUNTER — TELEPHONE (OUTPATIENT)
Dept: OBGYN CLINIC | Facility: CLINIC | Age: 30
End: 2020-03-27

## 2020-03-27 DIAGNOSIS — Z30.09 BIRTH CONTROL COUNSELING: ICD-10-CM

## 2020-03-27 RX ORDER — DROSPIRENONE AND ETHINYL ESTRADIOL 0.02-3(28)
1 KIT ORAL DAILY
Qty: 90 TABLET | Refills: 0 | Status: SHIPPED | OUTPATIENT
Start: 2020-03-27 | End: 2020-06-01 | Stop reason: SDUPTHER

## 2020-03-27 NOTE — TELEPHONE ENCOUNTER
Needs more refills birth control yearly has been scheduled out until June   Patient is currently out of pills one refill was called in already

## 2020-06-01 DIAGNOSIS — Z30.09 BIRTH CONTROL COUNSELING: ICD-10-CM

## 2020-06-01 RX ORDER — DROSPIRENONE AND ETHINYL ESTRADIOL 0.02-3(28)
1 KIT ORAL DAILY
Qty: 28 TABLET | Refills: 0 | Status: SHIPPED | OUTPATIENT
Start: 2020-06-01 | End: 2020-06-01

## 2020-06-05 ENCOUNTER — TELEPHONE (OUTPATIENT)
Dept: OBGYN CLINIC | Facility: CLINIC | Age: 30
End: 2020-06-05

## 2020-06-10 ENCOUNTER — TRANSCRIBE ORDERS (OUTPATIENT)
Dept: ADMINISTRATIVE | Facility: HOSPITAL | Age: 30
End: 2020-06-10

## 2020-06-10 ENCOUNTER — APPOINTMENT (OUTPATIENT)
Dept: LAB | Facility: HOSPITAL | Age: 30
End: 2020-06-10
Payer: COMMERCIAL

## 2020-06-10 DIAGNOSIS — E66.9 OBESITY, UNSPECIFIED CLASSIFICATION, UNSPECIFIED OBESITY TYPE, UNSPECIFIED WHETHER SERIOUS COMORBIDITY PRESENT: ICD-10-CM

## 2020-06-10 DIAGNOSIS — R63.5 WEIGHT GAIN: Primary | ICD-10-CM

## 2020-06-10 DIAGNOSIS — R63.5 WEIGHT GAIN: ICD-10-CM

## 2020-06-10 LAB
25(OH)D3 SERPL-MCNC: 33 NG/ML (ref 30–100)
ALBUMIN SERPL BCP-MCNC: 3.2 G/DL (ref 3.5–5)
ALP SERPL-CCNC: 45 U/L (ref 46–116)
ALT SERPL W P-5'-P-CCNC: 14 U/L (ref 12–78)
ANION GAP SERPL CALCULATED.3IONS-SCNC: 9 MMOL/L (ref 4–13)
AST SERPL W P-5'-P-CCNC: 12 U/L (ref 5–45)
BILIRUB SERPL-MCNC: 0.3 MG/DL (ref 0.2–1)
BUN SERPL-MCNC: 17 MG/DL (ref 5–25)
CALCIUM SERPL-MCNC: 8.9 MG/DL (ref 8.3–10.1)
CHLORIDE SERPL-SCNC: 104 MMOL/L (ref 100–108)
CHOLEST SERPL-MCNC: 196 MG/DL (ref 50–200)
CO2 SERPL-SCNC: 27 MMOL/L (ref 21–32)
CREAT SERPL-MCNC: 0.87 MG/DL (ref 0.6–1.3)
ERYTHROCYTE [DISTWIDTH] IN BLOOD BY AUTOMATED COUNT: 13.7 % (ref 11.6–15.1)
EST. AVERAGE GLUCOSE BLD GHB EST-MCNC: 103 MG/DL
GFR SERPL CREATININE-BSD FRML MDRD: 90 ML/MIN/1.73SQ M
GLUCOSE P FAST SERPL-MCNC: 95 MG/DL (ref 65–99)
HBA1C MFR BLD: 5.2 %
HCT VFR BLD AUTO: 42 % (ref 34.8–46.1)
HDLC SERPL-MCNC: 91 MG/DL
HGB BLD-MCNC: 13.4 G/DL (ref 11.5–15.4)
LDLC SERPL CALC-MCNC: 83 MG/DL (ref 0–100)
MCH RBC QN AUTO: 29.6 PG (ref 26.8–34.3)
MCHC RBC AUTO-ENTMCNC: 31.9 G/DL (ref 31.4–37.4)
MCV RBC AUTO: 93 FL (ref 82–98)
NONHDLC SERPL-MCNC: 105 MG/DL
PLATELET # BLD AUTO: 262 THOUSANDS/UL (ref 149–390)
PMV BLD AUTO: 11.6 FL (ref 8.9–12.7)
POTASSIUM SERPL-SCNC: 4 MMOL/L (ref 3.5–5.3)
PROT SERPL-MCNC: 6.9 G/DL (ref 6.4–8.2)
RBC # BLD AUTO: 4.53 MILLION/UL (ref 3.81–5.12)
SODIUM SERPL-SCNC: 140 MMOL/L (ref 136–145)
TRIGL SERPL-MCNC: 112 MG/DL
TSH SERPL DL<=0.05 MIU/L-ACNC: 1 UIU/ML (ref 0.36–3.74)
WBC # BLD AUTO: 6.23 THOUSAND/UL (ref 4.31–10.16)

## 2020-06-10 PROCEDURE — 80053 COMPREHEN METABOLIC PANEL: CPT

## 2020-06-10 PROCEDURE — 85027 COMPLETE CBC AUTOMATED: CPT

## 2020-06-10 PROCEDURE — 83036 HEMOGLOBIN GLYCOSYLATED A1C: CPT

## 2020-06-10 PROCEDURE — 36415 COLL VENOUS BLD VENIPUNCTURE: CPT

## 2020-06-10 PROCEDURE — 84443 ASSAY THYROID STIM HORMONE: CPT

## 2020-06-10 PROCEDURE — 80061 LIPID PANEL: CPT

## 2020-06-10 PROCEDURE — 82306 VITAMIN D 25 HYDROXY: CPT

## 2020-06-12 ENCOUNTER — ANNUAL EXAM (OUTPATIENT)
Dept: OBGYN CLINIC | Facility: CLINIC | Age: 30
End: 2020-06-12
Payer: COMMERCIAL

## 2020-06-12 VITALS
WEIGHT: 202 LBS | BODY MASS INDEX: 37.55 KG/M2 | SYSTOLIC BLOOD PRESSURE: 128 MMHG | DIASTOLIC BLOOD PRESSURE: 82 MMHG | TEMPERATURE: 98.3 F

## 2020-06-12 DIAGNOSIS — Z30.41 SURVEILLANCE OF PREVIOUSLY PRESCRIBED CONTRACEPTIVE PILL: ICD-10-CM

## 2020-06-12 DIAGNOSIS — Z30.09 BIRTH CONTROL COUNSELING: ICD-10-CM

## 2020-06-12 DIAGNOSIS — Z12.4 ENCOUNTER FOR PAPANICOLAOU SMEAR FOR CERVICAL CANCER SCREENING: Primary | ICD-10-CM

## 2020-06-12 DIAGNOSIS — Z01.419 ENCOUNTER FOR GYNECOLOGICAL EXAMINATION (GENERAL) (ROUTINE) WITHOUT ABNORMAL FINDINGS: ICD-10-CM

## 2020-06-12 DIAGNOSIS — Z11.51 SCREENING FOR HPV (HUMAN PAPILLOMAVIRUS): ICD-10-CM

## 2020-06-12 DIAGNOSIS — N87.0 DYSPLASIA OF CERVIX, LOW GRADE (CIN 1): ICD-10-CM

## 2020-06-12 PROCEDURE — G0145 SCR C/V CYTO,THINLAYER,RESCR: HCPCS | Performed by: NURSE PRACTITIONER

## 2020-06-12 PROCEDURE — 87624 HPV HI-RISK TYP POOLED RSLT: CPT | Performed by: NURSE PRACTITIONER

## 2020-06-12 PROCEDURE — 99395 PREV VISIT EST AGE 18-39: CPT | Performed by: NURSE PRACTITIONER

## 2020-06-12 RX ORDER — DROSPIRENONE AND ETHINYL ESTRADIOL 0.02-3(28)
1 KIT ORAL DAILY
Qty: 84 TABLET | Refills: 4 | Status: SHIPPED | OUTPATIENT
Start: 2020-06-12 | End: 2021-03-09 | Stop reason: ALTCHOICE

## 2020-06-16 LAB
HPV HR 12 DNA CVX QL NAA+PROBE: NEGATIVE
HPV16 DNA CVX QL NAA+PROBE: NEGATIVE
HPV18 DNA CVX QL NAA+PROBE: NEGATIVE

## 2020-06-23 LAB
LAB AP GYN PRIMARY INTERPRETATION: NORMAL
Lab: NORMAL

## 2021-03-09 ENCOUNTER — OFFICE VISIT (OUTPATIENT)
Dept: OBGYN CLINIC | Facility: CLINIC | Age: 31
End: 2021-03-09
Payer: COMMERCIAL

## 2021-03-09 VITALS — WEIGHT: 174 LBS | SYSTOLIC BLOOD PRESSURE: 116 MMHG | BODY MASS INDEX: 32.34 KG/M2 | DIASTOLIC BLOOD PRESSURE: 80 MMHG

## 2021-03-09 DIAGNOSIS — N92.1 BREAKTHROUGH BLEEDING ON BIRTH CONTROL PILLS: Primary | ICD-10-CM

## 2021-03-09 DIAGNOSIS — Z30.41 ORAL CONTRACEPTIVE PILL SURVEILLANCE: ICD-10-CM

## 2021-03-09 PROCEDURE — 99213 OFFICE O/P EST LOW 20 MIN: CPT | Performed by: NURSE PRACTITIONER

## 2021-03-09 RX ORDER — NORETHINDRONE ACETATE AND ETHINYL ESTRADIOL 1MG-20(21)
1 KIT ORAL DAILY
Qty: 90 TABLET | Refills: 1 | Status: SHIPPED | OUTPATIENT
Start: 2021-03-09 | End: 2021-06-15 | Stop reason: SDUPTHER

## 2021-03-09 NOTE — PROGRESS NOTES
Assessment/Plan:    Breakthrough bleeding on birth control pills  Discussed continuing to observe versus trial of a different brand  The patient elects trial of a different brand  Reviewed risks/benefits, SEs/AEs, directions for use, ACHES, reasons to use back up  F/u at routine annual in June or sooner PRN  Diagnoses and all orders for this visit:    Breakthrough bleeding on birth control pills    Oral contraceptive pill surveillance  -     norethindrone-ethinyl estradiol (JUNEL FE 1/20) 1-20 MG-MCG per tablet; Take 1 tablet by mouth daily          Subjective:      Patient ID: August Oviedo is a 27 y o  female  This patient presents with c/o BTB on OCP   She has been on Gianvi for years without complaints or abn bleeding patterns   6 mos ago she noted withdrawal bleed started midpack and continued through the end of placebos for a total of 3 weeks   This continued for 5 packs in a row   Last, most recent pack with no BTB and normal light withdrawal bleed during placebos   She denies concerns for preg or STI, declines testing   Denies compliance issues   Desires to continue pill as method   She lost over 30 lbs in the last 6 mos and wonders if metabolic changes are impacting bleeding patterns   Denies acute gyn complaints otherwise         The following portions of the patient's history were reviewed and updated as appropriate: allergies, current medications, past family history, past medical history, past social history, past surgical history and problem list     Review of Systems   Constitutional: Negative  Respiratory: Negative  Cardiovascular: Negative  Gastrointestinal: Negative  Genitourinary: Positive for menstrual problem  Negative for dyspareunia, dysuria, frequency, genital sores, hematuria, pelvic pain, vaginal bleeding and vaginal discharge  Musculoskeletal: Negative  Skin: Negative  Neurological: Negative  Psychiatric/Behavioral: Negative  Objective:      /80   Wt 78 9 kg (174 lb)   LMP 02/23/2021   BMI 32 34 kg/m²          Physical Exam  Constitutional:       Appearance: She is well-developed  HENT:      Head: Normocephalic  Eyes:      Pupils: Pupils are equal, round, and reactive to light  Neck:      Musculoskeletal: Normal range of motion  Pulmonary:      Effort: Pulmonary effort is normal    Neurological:      Mental Status: She is alert and oriented to person, place, and time  Psychiatric:         Behavior: Behavior normal          Thought Content:  Thought content normal          Judgment: Judgment normal

## 2021-03-09 NOTE — ASSESSMENT & PLAN NOTE
Discussed continuing to observe versus trial of a different brand  The patient elects trial of a different brand  Reviewed risks/benefits, SEs/AEs, directions for use, ACHES, reasons to use back up  F/u at routine annual in June or sooner PRN

## 2021-03-23 ENCOUNTER — TELEPHONE (OUTPATIENT)
Dept: OBGYN CLINIC | Facility: CLINIC | Age: 31
End: 2021-03-23

## 2021-03-23 NOTE — TELEPHONE ENCOUNTER
Patient states she was seen at urgent care on Friday  She was having bright yellow urine with a mix of blood  She was given amoxicillin but no change  Urine cultures have been negative per pt  On Sunday she still saw bright yellow urine so they called in Cipro  She's still having the same symptoms as of last night  She's not sure what's going on at this point  Urine cultures done at a urgent care but patient isn't sure of which one  We may need to call the facility and get them faxed over  Sean Escobar She was told to see a gynecologist  CB# [phone]

## 2021-03-23 NOTE — TELEPHONE ENCOUNTER
Bright yellow urine sounds ok  Color may change depending upon diet, meds, vitamins   Continue to drink fluids and observe for sx of vaginitis or UTI

## 2021-03-23 NOTE — TELEPHONE ENCOUNTER
Spoke to pt and relayed msg from [facility] and she will call if any UTI s/s and monitor for now

## 2021-03-23 NOTE — TELEPHONE ENCOUNTER
Spoke to pt:  3/19 went to urgent care for sinus infection put on amox & prednisone  3/20 had some brownish old blood and this bright yellow urine  3/21 went back to urgent care due to discoloration and they changed her to cipro  3/22 went back to check on cultures and they told her they were all neg  Never had UTI symptoms or issues before she went in  No more bleeding but her concern is this bright yellow urine  They referred her to her GYN

## 2021-06-15 ENCOUNTER — ANNUAL EXAM (OUTPATIENT)
Dept: OBGYN CLINIC | Facility: CLINIC | Age: 31
End: 2021-06-15
Payer: COMMERCIAL

## 2021-06-15 VITALS — BODY MASS INDEX: 33.27 KG/M2 | WEIGHT: 179 LBS | DIASTOLIC BLOOD PRESSURE: 80 MMHG | SYSTOLIC BLOOD PRESSURE: 122 MMHG

## 2021-06-15 DIAGNOSIS — Z30.41 ORAL CONTRACEPTIVE PILL SURVEILLANCE: ICD-10-CM

## 2021-06-15 DIAGNOSIS — Z30.41 SURVEILLANCE OF PREVIOUSLY PRESCRIBED CONTRACEPTIVE PILL: ICD-10-CM

## 2021-06-15 DIAGNOSIS — Z01.419 ENCOUNTER FOR GYNECOLOGICAL EXAMINATION (GENERAL) (ROUTINE) WITHOUT ABNORMAL FINDINGS: Primary | ICD-10-CM

## 2021-06-15 DIAGNOSIS — N87.0 DYSPLASIA OF CERVIX, LOW GRADE (CIN 1): ICD-10-CM

## 2021-06-15 PROBLEM — B97.7 HUMAN PAPILLOMA VIRUS (HPV) INFECTION: Status: RESOLVED | Noted: 2017-01-31 | Resolved: 2021-06-15

## 2021-06-15 PROBLEM — N92.1 BREAKTHROUGH BLEEDING ON BIRTH CONTROL PILLS: Status: RESOLVED | Noted: 2021-03-09 | Resolved: 2021-06-15

## 2021-06-15 PROCEDURE — 99395 PREV VISIT EST AGE 18-39: CPT | Performed by: NURSE PRACTITIONER

## 2021-06-15 RX ORDER — NORETHINDRONE ACETATE AND ETHINYL ESTRADIOL 1MG-20(21)
1 KIT ORAL DAILY
Qty: 90 TABLET | Refills: 4 | Status: SHIPPED | OUTPATIENT
Start: 2021-06-15 | End: 2022-08-09 | Stop reason: ALTCHOICE

## 2021-06-15 NOTE — ASSESSMENT & PLAN NOTE
Pap hx is as follows:   2013: normal cyto   2014: ASCUS, + HR HPV (-16/18)   12/2015: ASCUS, + HR HPV (-16/18)   1/2016: colpo with LSIL/CIN1, neg dysplasia  1/2017 normal cyto, neg HPV     Per ASCCP guidelines, continue age approp testing thus pap with cotesting was collected in 2020 and this was normal cytology and neg HPV  She was advised plan is cyto with cotesting in 2023

## 2021-06-15 NOTE — PROGRESS NOTES
Assessment/Plan:    Surveillance of previously prescribed contraceptive pill  The patient likes current OCP  She denies ACHES and desires to continue  She is aware condoms are advised with all sexual contact for prevention of STI  Refill provided  Reviewed reasons to call  Encounter for gynecological examination (general) (routine) without abnormal findings  Normal findings on routine annual gyn exam  Recommended monthly SBE, annual CBE  Reviewed ASCCP guidelines and pap noted to be up to date  The patient reports she has completed Gardasil series  STI testing was offered and declined at this time; the patient is aware that condoms are recommended for all sexual contact for prevention of STI  The patient likes current contraceptive measure and desires to continue (OCP)  Reviewed diet/activity recommendations and reasons to call  F/u in one year for routine annual gyn exam or sooner PRN  History of dysplasia of cervix, low grade (MAGGI 1)  Pap hx is as follows:   2013: normal cyto   2014: ASCUS, + HR HPV (-16/18)   12/2015: ASCUS, + HR HPV (-16/18)   1/2016: colpo with LSIL/CIN1, neg dysplasia  1/2017 normal cyto, neg HPV     Per ASCCP guidelines, continue age approp testing thus pap with cotesting was collected in 2020 and this was normal cytology and neg HPV  She was advised plan is cyto with cotesting in 2023  Diagnoses and all orders for this visit:    Encounter for gynecological examination (general) (routine) without abnormal findings    Surveillance of previously prescribed contraceptive pill    History of dysplasia of cervix, low grade (MAGGI 1)          Subjective:      Patient ID: Mary Anne Horan is a 32 y o  female  This patient presents for routine annual gyn exam    Medically stable   Withdrawal bleeds are light and reg on NILES  Denies ACHES and desires to continue  She denies acute gyn complaints   She denies pelvic pain, breast concerns, abnormal discharge, bowel/bladder dysfunction  Currently single and not SA  She denies STI concerns  She recently broke up with BF - he does not want children and she does  She is sad about this but denies depression/anx  She is back to work in the salon       The following portions of the patient's history were reviewed and updated as appropriate: allergies, current medications, past family history, past medical history, past social history, past surgical history and problem list     Review of Systems   Constitutional: Negative  Respiratory: Negative  Cardiovascular: Negative  Gastrointestinal: Negative  Genitourinary: Negative  Musculoskeletal: Negative  Skin: Negative  Neurological: Negative  Psychiatric/Behavioral: Negative  Objective:      /80   Wt 81 2 kg (179 lb)   LMP 05/24/2021   BMI 33 27 kg/m²          Physical Exam  Constitutional:       Appearance: She is well-developed  HENT:      Head: Normocephalic and atraumatic  Eyes:      Pupils: Pupils are equal, round, and reactive to light  Neck:      Thyroid: No thyromegaly  Cardiovascular:      Rate and Rhythm: Normal rate and regular rhythm  Heart sounds: Normal heart sounds  Pulmonary:      Effort: Pulmonary effort is normal  No respiratory distress  Breath sounds: Normal breath sounds  No wheezing or rales  Chest:      Chest wall: No mass, deformity or tenderness  Breasts: Breasts are symmetrical          Right: No inverted nipple, mass, nipple discharge, skin change or tenderness  Left: No inverted nipple, mass, nipple discharge, skin change or tenderness  Abdominal:      General: There is no distension  Palpations: Abdomen is soft  There is no hepatomegaly, splenomegaly or mass  Tenderness: There is no abdominal tenderness  There is no guarding or rebound  Genitourinary:     Labia:         Right: No rash, tenderness, lesion or injury  Left: No rash, tenderness, lesion or injury  Vagina: Normal  No foreign body  No vaginal discharge, erythema, tenderness or bleeding  Cervix: No cervical motion tenderness, discharge or friability  Uterus: Normal        Adnexa:         Right: No mass, tenderness or fullness  Left: No mass, tenderness or fullness  Rectum: Normal    Musculoskeletal:         General: Normal range of motion  Cervical back: Normal range of motion and neck supple  Lymphadenopathy:      Cervical: No cervical adenopathy  Skin:     General: Skin is warm and dry  Findings: No rash  Nails: There is no clubbing  Neurological:      Mental Status: She is alert and oriented to person, place, and time  Cranial Nerves: No cranial nerve deficit  Psychiatric:         Speech: Speech normal          Behavior: Behavior normal          Thought Content:  Thought content normal          Judgment: Judgment normal

## 2021-10-19 ENCOUNTER — OFFICE VISIT (OUTPATIENT)
Dept: BARIATRICS | Facility: CLINIC | Age: 31
End: 2021-10-19

## 2021-10-19 VITALS — BODY MASS INDEX: 33.53 KG/M2 | WEIGHT: 182.2 LBS | HEIGHT: 62 IN

## 2021-10-19 DIAGNOSIS — R63.5 ABNORMAL WEIGHT GAIN: Primary | ICD-10-CM

## 2021-10-19 PROCEDURE — RECHECK

## 2022-03-03 ENCOUNTER — OFFICE VISIT (OUTPATIENT)
Dept: BARIATRICS | Facility: CLINIC | Age: 32
End: 2022-03-03
Payer: COMMERCIAL

## 2022-03-03 VITALS
BODY MASS INDEX: 36.07 KG/M2 | HEIGHT: 62 IN | SYSTOLIC BLOOD PRESSURE: 110 MMHG | RESPIRATION RATE: 16 BRPM | HEART RATE: 68 BPM | TEMPERATURE: 96.8 F | WEIGHT: 196 LBS | DIASTOLIC BLOOD PRESSURE: 76 MMHG

## 2022-03-03 DIAGNOSIS — R63.5 ABNORMAL WEIGHT GAIN: ICD-10-CM

## 2022-03-03 DIAGNOSIS — Z78.9 ADVISED ABOUT MANAGEMENT OF WEIGHT: ICD-10-CM

## 2022-03-03 DIAGNOSIS — E66.9 OBESITY, CLASS II, BMI 35-39.9: Primary | ICD-10-CM

## 2022-03-03 DIAGNOSIS — F41.9 ANXIETY: ICD-10-CM

## 2022-03-03 PROCEDURE — 99214 OFFICE O/P EST MOD 30 MIN: CPT | Performed by: INTERNAL MEDICINE

## 2022-03-03 RX ORDER — BUPROPION HYDROCHLORIDE 150 MG/1
150 TABLET ORAL DAILY
Qty: 30 TABLET | Refills: 1 | Status: SHIPPED | OUTPATIENT
Start: 2022-03-03 | End: 2022-03-28

## 2022-03-03 NOTE — PROGRESS NOTES
Assessment/Plan:  Bambi was seen today for follow-up  Diagnoses and all orders for this visit:    Obesity, Class II, BMI 35-39 9  -     Ambulatory Referral to [facility]; Future  -     buPROPion (Wellbutrin XL) 150 mg 24 hr tablet; Take 1 tablet (150 mg total) by mouth daily    Advised about management of weight  -     buPROPion (Wellbutrin XL) 150 mg 24 hr tablet; Take 1 tablet (150 mg total) by mouth daily    Abnormal weight gain  -     buPROPion (Wellbutrin XL) 150 mg 24 hr tablet; Take 1 tablet (150 mg total) by mouth daily    Anxiety  -     Ambulatory Referral to [facility]; Future  -     buPROPion (Wellbutrin XL) 150 mg 24 hr tablet; Take 1 tablet (150 mg total) by mouth daily       Plan: Will trial Wellbutrin  Can also consider topamax and/or venlafaxine which is more weight neutral  Refer to [facility]  Work on anxiety, sleep  Follow meal plan  Continue with exercise  Avoid phentermine due to anxiety  GLP-1 not covered by her insurance     Total time spent: 30 minutes with >50% face-to-face time with the patient  Follow up in approximately 1 month with Non-Surgical Physician/Advanced Practitioner  Subjective:   Chief Complaint   Patient presents with    Follow-up     Patient will see Dariel Barnes for first time to discuss about medication to loose weight  Patient ID: Josefina Mcwilliams  is a 32 y o  female with excess weight/obesity here to pursue weight management  Patient presents for an overdue f/u  Previously did VLCD then partial meal replacement  Last seen by RD in 10/2021- gained 14 lbs since  Most recent notes and records were reviewed      HPI  -Initial weight loss goal of 5-10% weight loss for improved health  Wt Readings from Last 10 Encounters:   03/03/22 88 9 kg (196 lb)   10/19/21 82 6 kg (182 lb 3 2 oz)   06/15/21 81 2 kg (179 lb)   03/09/21 78 9 kg (174 lb)   06/12/20 91 6 kg (202 lb)   10/29/19 99 2 kg (218 lb 9 6 oz)   09/10/19 102 kg (224 lb)   07/30/19 99 9 kg (220 lb 3 2 oz)   06/11/19 98 4 kg (217 lb)   05/07/19 99 1 kg (218 lb 6 4 oz)     Initial weight: 238 (1/2019)  Current weight: 196 lbs  Change in weight: -42 lbs   She fell off track last May (162 lbs) due to a breakup, went through depression/anxiety  She continued exercise during this time but unable to lose weight  Still has severe anxiety that affects her eating habits, cravings and sleep  Previously tried phentermine+topamax but made her jittery    Supposed to eat 1122 calories as below based on what her  gave her but craves more so ends up eating french fries, sweets, etc  B- 3 eggs  S- protein bar (Built bar or One bar)  L- 4 oz prot, broccoli  S- nuts   D- same as lunch  S- premier protein shake   Drinks- over a gallon    Alcohol- no   Exercise- Exercises 3 days strenghtening with , 4-5 days cardio      The following portions of the patient's history were reviewed and updated as appropriate: allergies, current medications, past family history, past medical history, past social history, past surgical history, and problem list     Review of Systems   Respiratory: Negative  Cardiovascular: Negative  Gastrointestinal: Negative  Psychiatric/Behavioral: The patient is nervous/anxious  Objective:  /76 (BP Location: Left arm, Patient Position: Sitting, Cuff Size: Large)   Pulse 68   Temp (!) 96 8 °F (36 °C)   Resp 16   Ht 5' 1 5" (1 562 m)   Wt 88 9 kg (196 lb)   BMI 36 43 kg/m²   Constitutional: Well-developed, well-nourished and obese Body mass index is 36 43 kg/m²  [facility] HEENT: No conjunctival pallor or jaundice  Pulmonary: No increased work of breathing or signs of respiratory distress  CV: Normal rate, well-perfused  GI: Obese  Non-distended  MSK: No edema   Neuro: Oriented to person, place and time  Normal Speech  Normal gait    Psych: anxious     Labs and Imaging  Most recent labs and imaging reviewed

## 2022-03-06 NOTE — PROGRESS NOTES
Weight Management Medical Nutrition Assessment  Sonal Campa is here today for 1/3 RD bundle visit   Today she weighs 224 2  lbs giving her a loss of 1 lb in the last 2 wks  She is currently doing a partial meal plan ( 2 meal replacements and a sensible dinner, and food snack)  She has been starting to practice interval eating  Reviewed with her  She continues to drink 80 oz of water and has also started to exercise  She is very conscious of what she is eating and even while in [place] with friends, practiced mindful eating  Before she admits that she would have chosen a high fat, fried item, and this time she got a salad with shrimp and dressing on the side  She loves bread and pizza but has not been having any since she feels like that might make her go off track        Anthropometric Measurements  Start Weight (lbs): 244 1 lbs  Current Weight (lbs): 224 2   lbs  TBW % Change from start weight:  6%  Ideal Body Weight (lbs): 107 5 lbs  Goal Weight (lbs):  200 lbs or less     Weight Loss History  Previous weight loss attempts: Self Created Diets (Portion Control, Healthy Food Choices, etc )     Food and Nutrition Related History     Food Recall  Breakfast: BCKSY          Snack: blueberries  Lunch;shake  Snack: cheese stick  Dinner: salad  Snack: ( has not been eating after 7pm)        Beverages: water  Volume of beverage intake: 80 oz     Weekends: Same  Cravings: none currently  Trouble area of day:na     Frequency of Eating out: irregularly  (she is making smarter choices when she does eat out)  Food restrictions:carbs while on VLCD  Cooking: self   Food Shopping: self     Physical Activity Intake  Activity: stationary bike  Frequency: 3 -times per week  Physical limitations/barriers to exercise: none     Estimated Needs  Energy     Bear Marisol Energy Needs:  BMR : 1102  9-6# loss weekly sedentary:   1287 - 0376      1-5# loss weekly lightly active:  9213 - 3528  Protein: 58 - 73   (1 2-1 5g/kg IBW)  Fluid:   57 oz   (35mL/kg IBW)     Nutrition Diagnosis  Yes;    Overweight/obesity  related to Excess energy intake as evidenced by  BMI more than normative standard for age and sex (obesity-grade II 35-39  9)     Nutrition Intervention     Nutrition Prescription  Calories:  1000 calories  (1200 if exercise day)  Protein:  68 gr  Fluid:  80 oz     Meal Plan  Breakfast:  Shake  Snack: protein (ie   String cheese, nuts)  Lunch:  pudding  Snack: bar  Dinner:  Sensible dinner   Snack: none     Nutrition Education:    VLCD guide lines  Calorie controlled menu  Lean protein food choices  Healthy snack options           Nutrition Counseling:  Strategies: meal planning, portion sizes, healthy snack choices, hydration, fiber intake, protein intake, exercise, food journal        Monitoring and Evaluation:  Evaluation criteria:  Energy Intake  Meet protein needs  Maintain adequate hydration  Monitor weekly weight     Barriers to learning:none  Readiness to change: Action  Comprehension: good  Expected Compliance: good Yes

## 2022-03-18 ENCOUNTER — TELEPHONE (OUTPATIENT)
Dept: PSYCHIATRY | Facility: CLINIC | Age: 32
End: 2022-03-18

## 2022-04-05 ENCOUNTER — OFFICE VISIT (OUTPATIENT)
Dept: BARIATRICS | Facility: CLINIC | Age: 32
End: 2022-04-05
Payer: COMMERCIAL

## 2022-04-05 VITALS
HEIGHT: 62 IN | TEMPERATURE: 98.6 F | WEIGHT: 193.3 LBS | SYSTOLIC BLOOD PRESSURE: 120 MMHG | HEART RATE: 75 BPM | BODY MASS INDEX: 35.57 KG/M2 | DIASTOLIC BLOOD PRESSURE: 64 MMHG | RESPIRATION RATE: 16 BRPM

## 2022-04-05 DIAGNOSIS — R63.5 ABNORMAL WEIGHT GAIN: ICD-10-CM

## 2022-04-05 DIAGNOSIS — E66.9 OBESITY, CLASS II, BMI 35-39.9: Primary | ICD-10-CM

## 2022-04-05 PROCEDURE — 99214 OFFICE O/P EST MOD 30 MIN: CPT | Performed by: INTERNAL MEDICINE

## 2022-04-05 RX ORDER — NALTREXONE HYDROCHLORIDE 50 MG/1
TABLET, FILM COATED ORAL
Qty: 30 TABLET | Refills: 2 | Status: SHIPPED | OUTPATIENT
Start: 2022-04-05 | End: 2022-08-09 | Stop reason: ALTCHOICE

## 2022-04-05 NOTE — PROGRESS NOTES
Assessment/Plan:  Joe Rich was seen today for follow-up  Diagnoses and all orders for this visit:    Obesity, Class II, BMI 35-39 9  Abnormal weight gain  Continue Wellbutrin- helping a lot with her depression/anxiety  Will add naltrexone  Can consider adding topamax if adding naltrexone to wellbutrin ineffective  Avoid phentermine due to anxiety  GLP-1 not covered by her insurance   Continue being consistent with diet/exercise  - naltrexone (REVIA) 50 mg tablet; Take half tablet once daily for 1 week with Wellbutrin, then take one tablet with Wellbutrin daily  Do not take with high fat meals  Dispense: 30 tablet; Refill: 2    Anxiety  Better controlled on wellbutrin    Total time spent: 30 minutes with >50% face-to-face time with the patient  Follow up in approximately 2 months with Non-Surgical Physician/Advanced Practitioner  Subjective:   Chief Complaint   Patient presents with    Follow-up       Patient ID: Wesly Sandy  is a 28 y o  female with excess weight/obesity here to pursue weight management  Patient presents for an overdue f/u  Previously did VLCD then partial meal replacement  Last seen by DALY in 10/2021- gained 14 lbs since  Most recent notes and records were reviewed  HPI  -Initial weight loss goal of 5-10% weight loss for improved health  Wt Readings from Last 10 Encounters:   04/05/22 87 7 kg (193 lb 4 8 oz)   03/03/22 88 9 kg (196 lb)   10/19/21 82 6 kg (182 lb 3 2 oz)   06/15/21 81 2 kg (179 lb)   03/09/21 78 9 kg (174 lb)   06/12/20 91 6 kg (202 lb)   10/29/19 99 2 kg (218 lb 9 6 oz)   09/10/19 102 kg (224 lb)   07/30/19 99 9 kg (220 lb 3 2 oz)   06/11/19 98 4 kg (217 lb)     Initial weight: 238 (1/2019)  Current weight: 193 lbs  Change in weight: -45 lbs     Started on Wellbutrin in 3/2022 due to anxiety/dpression  Tolerating well w/o issues   Anxiety/depression has improved, helping her be more consistent with her diet  Previously tried phentermine+topamax but made her jittery  More consistent with the diet plan her  gave  1100 calories  B- 5 eggs, premier protein shake  S- yogurt  L- protein shake  S- nuts   D- 5 oz chicken veggies  S-    Drinks- over a gallon    Alcohol- no   Exercise- Exercises 3 days strenghtening with , 4-5 days cardio      The following portions of the patient's history were reviewed and updated as appropriate: allergies, current medications, past family history, past medical history, past social history, past surgical history, and problem list     Review of Systems   Respiratory: Negative  Cardiovascular: Negative  Gastrointestinal: Negative  Psychiatric/Behavioral: The patient is nervous/anxious  Objective:  /64 (BP Location: Left arm, Patient Position: Sitting, Cuff Size: Large)   Pulse 75   Temp 98 6 °F (37 °C) (Tympanic)   Resp 16   Ht 5' 1 5" (1 562 m)   Wt 87 7 kg (193 lb 4 8 oz)   BMI 35 93 kg/m²   Constitutional: Well-developed, well-nourished and obese Body mass index is 35 93 kg/m²  [facility] HEENT: No conjunctival pallor or jaundice  Pulmonary: No increased work of breathing or signs of respiratory distress  CV: Normal rate, well-perfused  GI: Obese  Non-distended  MSK: No edema   Neuro: Oriented to person, place and time  Normal Speech  Normal gait    Psych: anxious     Labs and Imaging  Most recent labs and imaging reviewed

## 2022-04-06 ENCOUNTER — TELEPHONE (OUTPATIENT)
Dept: BARIATRICS | Facility: CLINIC | Age: 32
End: 2022-04-06

## 2022-06-14 ENCOUNTER — OFFICE VISIT (OUTPATIENT)
Dept: BARIATRICS | Facility: CLINIC | Age: 32
End: 2022-06-14
Payer: COMMERCIAL

## 2022-06-14 VITALS
WEIGHT: 190.5 LBS | DIASTOLIC BLOOD PRESSURE: 80 MMHG | RESPIRATION RATE: 16 BRPM | BODY MASS INDEX: 35.06 KG/M2 | HEIGHT: 62 IN | SYSTOLIC BLOOD PRESSURE: 110 MMHG | HEART RATE: 72 BPM

## 2022-06-14 DIAGNOSIS — E66.9 OBESITY, CLASS II, BMI 35-39.9: Primary | ICD-10-CM

## 2022-06-14 DIAGNOSIS — R63.5 ABNORMAL WEIGHT GAIN: ICD-10-CM

## 2022-06-14 DIAGNOSIS — F41.9 ANXIETY: ICD-10-CM

## 2022-06-14 LAB — SL AMB POCT URINE HCG: NEGATIVE

## 2022-06-14 PROCEDURE — 81025 URINE PREGNANCY TEST: CPT | Performed by: INTERNAL MEDICINE

## 2022-06-14 PROCEDURE — 99214 OFFICE O/P EST MOD 30 MIN: CPT | Performed by: INTERNAL MEDICINE

## 2022-06-14 RX ORDER — TOPIRAMATE 50 MG/1
TABLET, FILM COATED ORAL
Qty: 30 TABLET | Refills: 3 | Status: SHIPPED | OUTPATIENT
Start: 2022-06-14 | End: 2022-07-06

## 2022-06-14 NOTE — PROGRESS NOTES
Assessment/Plan:  Manasa No was seen today for follow-up  Diagnoses and all orders for this visit:    Obesity, Class II, BMI 35-39 9  Abnormal weight gain  Continue Wellbutrin- helping a lot with her depression/anxiety  Slow weight loss progress; Will add - topiramate (TOPAMAX) 50 MG tablet; Take half tablet at night for 1 week, then take 1 tablet at night  Dispense: 30 tablet; Refill: 3  - POCT urine HCG  Patient denies glaucoma or recent renal stones  Potential side effects of topiramate include but are not limited to cognitive dysfunction such as forgetfulness, fatigue, upper respiratory infection symptoms, constipation, abdominal upset, numbness/tingling, dizziness, worsening anxiety or depression, fetal defects and rarely kidney stones  Discussed the potential teratogenicity with Topamax and recommended continued use of contraception  Consent form signed and scanned into patient's chart  Pregnancy test negative  Will increase calorie range  1500 calories on cardio days  1700 calories on weight training   Avoid phentermine due to anxiety  GLP-1 not covered by her insurance   Continue being consistent with diet/exercise    Anxiety  Better controlled on Wellbutrin- continue    Total time spent: 30 minutes with >50% face-to-face time with the patient  Follow up in approximately 3 months with Non-Surgical Physician/Advanced Practitioner  Subjective:   Chief Complaint   Patient presents with    Follow-up       Patient ID: Eli Jain  is a 28 y o  female with excess weight/obesity here to pursue weight management  Patient presents for an overdue f/u  Previously did VLCD then partial meal replacement  Last seen by DALY in 10/2021- gained 14 lbs since  Most recent notes and records were reviewed      HPI  -Initial weight loss goal of 5-10% weight loss for improved health  Wt Readings from Last 10 Encounters:   06/14/22 86 4 kg (190 lb 8 oz)   04/05/22 87 7 kg (193 lb 4 8 oz)   03/03/22 88 9 kg (196 lb)   10/19/21 82 6 kg (182 lb 3 2 oz)   06/15/21 81 2 kg (179 lb)   03/09/21 78 9 kg (174 lb)   06/12/20 91 6 kg (202 lb)   10/29/19 99 2 kg (218 lb 9 6 oz)   09/10/19 102 kg (224 lb)   07/30/19 99 9 kg (220 lb 3 2 oz)     Initial weight: 238 (1/2019)  Current weight: 190 lbs  Change in weight: -48 lbs     Started on Wellbutrin in 3/2022 due to anxiety/dpression  Added naltrexone but did not tolerate dt n/v so stopped after 1/2 tablet   Previously tried phentermine+topamax but made her jittery  More consistent with the diet plan her  gave  1100 calories  Still has cravings for any food but does not feel hungry  B- 5 eggs, premier protein shake  S- yogurt  L- protein shake  S- nuts   D- 5 oz chicken veggies  S-    Drinks- over a gallon    Alcohol- no   Exercise- Exercises 3 days strenghtening with , 4-5 days cardio      The following portions of the patient's history were reviewed and updated as appropriate: allergies, current medications, past family history, past medical history, past social history, past surgical history, and problem list     Review of Systems   Respiratory: Negative  Cardiovascular: Negative  Gastrointestinal: Negative  Psychiatric/Behavioral: The patient is nervous/anxious  Objective:  /80   Pulse 72   Resp 16   Ht 5' 1 5" (1 562 m)   Wt 86 4 kg (190 lb 8 oz)   BMI 35 41 kg/m²   Constitutional: Well-developed, well-nourished and obese Body mass index is 35 41 kg/m²  [facility] HEENT: No conjunctival pallor or jaundice  Pulmonary: No increased work of breathing or signs of respiratory distress  CV: Normal rate, well-perfused  GI: Obese  Non-distended  MSK: No edema   Neuro: Oriented to person, place and time  Normal Speech  Normal gait    Psych: anxious     Labs and Imaging  Most recent labs and imaging reviewed

## 2022-07-27 ENCOUNTER — TELEPHONE (OUTPATIENT)
Dept: BARIATRICS | Facility: CLINIC | Age: 32
End: 2022-07-27

## 2022-08-09 ENCOUNTER — ANNUAL EXAM (OUTPATIENT)
Dept: OBGYN CLINIC | Age: 32
End: 2022-08-09
Payer: COMMERCIAL

## 2022-08-09 VITALS
WEIGHT: 182 LBS | DIASTOLIC BLOOD PRESSURE: 78 MMHG | BODY MASS INDEX: 32.25 KG/M2 | SYSTOLIC BLOOD PRESSURE: 118 MMHG | HEIGHT: 63 IN

## 2022-08-09 DIAGNOSIS — Z01.419 ENCOUNTER FOR GYNECOLOGICAL EXAMINATION (GENERAL) (ROUTINE) WITHOUT ABNORMAL FINDINGS: Primary | ICD-10-CM

## 2022-08-09 DIAGNOSIS — N92.1 BREAKTHROUGH BLEEDING ON BIRTH CONTROL PILLS: ICD-10-CM

## 2022-08-09 DIAGNOSIS — Z30.41 SURVEILLANCE OF PREVIOUSLY PRESCRIBED CONTRACEPTIVE PILL: ICD-10-CM

## 2022-08-09 PROBLEM — M22.2X9 PATELLOFEMORAL STRESS SYNDROME: Status: ACTIVE | Noted: 2020-05-30

## 2022-08-09 PROBLEM — J32.3 CHRONIC SPHENOIDAL SINUSITIS: Status: ACTIVE | Noted: 2018-08-15

## 2022-08-09 PROBLEM — E66.01 OBESITY, CLASS III, BMI 40-49.9 (MORBID OBESITY) (HCC): Status: RESOLVED | Noted: 2019-01-08 | Resolved: 2022-08-09

## 2022-08-09 PROBLEM — J45.909 ASTHMA: Status: RESOLVED | Noted: 2017-11-27 | Resolved: 2022-08-09

## 2022-08-09 PROBLEM — J30.9 ALLERGIC RHINITIS DUE TO ALLERGEN: Status: ACTIVE | Noted: 2020-05-30

## 2022-08-09 PROBLEM — J32.1 CHRONIC FRONTAL SINUSITIS: Status: ACTIVE | Noted: 2018-08-15

## 2022-08-09 PROCEDURE — 99395 PREV VISIT EST AGE 18-39: CPT | Performed by: NURSE PRACTITIONER

## 2022-08-09 PROCEDURE — 0503F POSTPARTUM CARE VISIT: CPT | Performed by: NURSE PRACTITIONER

## 2022-08-09 RX ORDER — CETIRIZINE HYDROCHLORIDE 10 MG/1
10 TABLET ORAL DAILY
COMMUNITY

## 2022-08-09 RX ORDER — MONTELUKAST SODIUM 10 MG/1
TABLET ORAL
COMMUNITY

## 2022-08-09 RX ORDER — LORATADINE 10 MG/1
CAPSULE, LIQUID FILLED ORAL
COMMUNITY

## 2022-08-09 RX ORDER — DROSPIRENONE AND ETHINYL ESTRADIOL 0.02-3(28)
1 KIT ORAL DAILY
Qty: 28 TABLET | Refills: 12 | Status: SHIPPED | OUTPATIENT
Start: 2022-08-09 | End: 2022-09-06

## 2022-08-09 NOTE — PATIENT INSTRUCTIONS
Birth Control Pills   WHAT YOU NEED TO KNOW:   What are birth control pills? Birth control pills are also called oral contraceptives, or the pill  It is medicine that helps prevent pregnancy by stopping ovulation  Ovulation is when the ovaries make and release an egg cell each month  If this egg gets fertilized by sperm, pregnancy occurs  You will need to take the pill at the same time every day  Your healthcare provider will tell you when to start taking the pill  You will also be told what to do if you miss a dose  Instructions will depend on the kind of birth control pills you are taking  What are the different kinds of birth control pills? Some kinds are taken for 21 days in a row, followed by 7 days of placebo (no hormones) pills  Other kinds are taken for 24 days followed by 4 days of placebos  Each kind has a certain amount of female hormones  Your provider will decide on the kind that is best for you based on your age and other health conditions  What may be done before I can start taking birth control pills? You need to see your healthcare provider to get a prescription  Any of the following may be done before your healthcare provider gives you a prescription:  Your healthcare provider will ask about diseases and illnesses you have had in the past  Your provider will check your risk for blood clots, heart conditions, or stroke  Tell your provider if you had gastric bypass surgery  This surgery can affect the way your body absorbs medicines such as birth control pills  Your provider will also check your blood pressure, and may do a breast and pelvic exam  A Pap smear may also be done during the pelvic exam  This is a test to make sure you do not have abnormal changes on your cervix  You may need other tests, such as a urine test to make sure you are not pregnant  Your provider will ask if you take any medicines and if you smoke   Smoking increases your risk for stroke, heart attack, or a blood clot in your lungs  If you smoke, you should not take certain kinds of birth control pills  What are the advantages of birth control pills? When birth control pills are used correctly, the chances of getting pregnant are very low  Birth control pills may help decrease bleeding and pain during your monthly period  They may also help prevent cancer of the uterus and ovaries  What are the disadvantages of birth control pills? You may have sudden changes in your mood or feelings while you take birth control pills  You may have nausea and a decreased sex drive  You may have an increased appetite and rapid weight gain  You may also have bleeding in between periods, less frequent periods, vaginal dryness, and breast pain  Birth control pills will not protect you from sexually transmitted infections  Rarely, some birth control pills can increase your risk for a blood clot  This may become life-threatening  What should I do if I decide I want to get pregnant? If you are planning to have a baby, ask your healthcare provider when you may stop taking your birth control pills  It may take some time for you to start ovulating again  Ask your healthcare provider for more information about pregnancy after birth control pills  When should I start taking birth control pills after I have a baby? If you are not breastfeeding, you may start taking birth control pills 3 weeks after you give birth  You may be able to take certain types of birth control pills if you are breastfeeding  These pills can be started from 6 weeks to 6 months after you give birth  Ask your healthcare provider for more information about when to start taking birth control pills after you give birth  What do I need to know about birth control pills and menopause? Talk with your healthcare provider if you want to take birth control pills around menopause  Around age 39, you will enter into perimenopause   This means your hormone levels are dropping and you are ovulating less often  You can still become pregnant during this time  The risk for problems, such as miscarriage, are higher if you become pregnant after age 39  Birth control pills will prevent pregnancy, and may also help prevent or relieve some signs and symptoms of menopause  Examples are hot flashes and mood swings  Your provider will do tests when you are around age 48  The tests may show that you are in menopause  If the tests do not show menopause for sure, you may be able to continue taking the pill up to age 54  The decision will depend on your health and if you have any medical conditions, such as a blood clot  Call your local emergency number (911 in the [address] [facility] [address]) for any of the following: You have any of the following signs of a stroke:      Numbness or drooping on one side of your face     Weakness in an arm or leg    Confusion or difficulty speaking    Dizziness, a severe headache, or vision loss    You feel lightheaded, short of breath, and have chest pain  You cough up blood  When should I seek immediate care? Your arm or leg feels warm, tender, and painful  It may look swollen and red  You have severe pain, numbness, or swelling in your arms or legs  When should I call my doctor? You have forgotten to take a birth control pill  You have mood changes, such as depression, since starting birth control pills  You have nausea or are vomiting  You have severe abdominal pain  You missed a period and have questions or concerns about being pregnant  You still have bleeding 4 months after taking birth control pills correctly  You have questions or concerns about your condition or care  CARE AGREEMENT:   You have the right to help plan your care  Learn about your health condition and how it may be treated  Discuss treatment options with your healthcare providers to decide what care you want to receive  You always have the right to refuse treatment   The above information is an  only  It is not intended as medical advice for individual conditions or treatments  Talk to your doctor, nurse or pharmacist before following any medical regimen to see if it is safe and effective for you  © Copyright [employer] [facility] 2022 Information is for End User's use only and may not be sold, redistributed or otherwise used for commercial purposes   All illustrations and images included in CareNotes® are the copyrighted property of [employer]  or [address] [place] [address]

## 2022-08-09 NOTE — PROGRESS NOTES
Diagnoses and all orders for this visit:    Encounter for gynecological examination (general) (routine) without abnormal findings    Breakthrough bleeding on birth control pills  -     drospirenone-ethinyl estradiol (Gianvi) 3-0 02 MG per tablet; Take 1 tablet by mouth daily for 28 days    Surveillance of previously prescribed contraceptive pill  -     drospirenone-ethinyl estradiol (Gianvi) 3-0 02 MG per tablet; Take 1 tablet by mouth daily for 28 days        Calcium/vit d inclusion in the diet discussed, call with any issues, SBE reinforced, all concerns addressed  Pleasant 28 y o  premenopausal female here for annual exam  She has issues with breakthrough bleeding on her ocp, during the second week  She is on Topamax for weight loss and understands this could interfere with her pill  She would like to go back to [place] because she states she did well with that one  She will call for an increase in her pill if that does not resolve the issue  She has a history of abnormal pap smears  Last Pap was done on 06/12/2020 neg/neg , no pap done today BUT due again in 2023 per [facility] (see Paps below)  She denies any vaginal issues  She denies pelvic pain  She denies any other issues with her current BCM  NOT Sexually active in 2 years  She declined STD screening and completion of her gardasil series  I only saw 1 gardasil received  History of dysplasia of cervix, low grade (MAGGI 1)  Pap hx is as follows:   2013: normal cyto   2014: ASCUS, + HR HPV (-16/18)   12/2015: ASCUS, + HR HPV (-16/18)   1/2016: colpo with LSIL/CIN1, neg dysplasia  1/2017 normal cyto, neg HPV    2020 Pap nml and HPV neg    Past Medical History:   Diagnosis Date    Atypical squamous cells of undetermined significance (ASCUS) on Papanicolaou smear of cervix     HPV (human papilloma virus) infection      Past Surgical History:   Procedure Laterality Date    ANKLE SURGERY      CHOLECYSTECTOMY      description 10/29/2014    COLONOSCOPY      COLPOSCOPY      of cervix with biopsy    ESOPHAGOGASTRODUODENOSCOPY      diagnostic    INDUCED       surgically induced, description 2014     Family History   Problem Relation Age of Onset    Diabetes Maternal Grandfather     Multiple sclerosis Paternal Grandmother     Heart disease Paternal Grandfather     Colon cancer Paternal Grandfather     Diabetes Maternal Uncle     Hypertension Family     Varicose Veins Family         lower extremities    No Known Problems Mother     No Known Problems Father     Stroke Neg Hx     Thyroid disease Neg Hx      Social History     Tobacco Use    Smoking status: Never Smoker    Smokeless tobacco: Never Used   Vaping Use    Vaping Use: Never used   Substance Use Topics    Alcohol use: No    Drug use: No       Current Outpatient Medications:     aspirin 325 mg tablet, Take 650 mg by mouth, Disp: , Rfl:     cetirizine (ZyrTEC) 10 mg tablet, Take 10 mg by mouth daily, Disp: , Rfl:     Cetirizine HCl 10 MG CAPS, Take 10 mg by mouth, Disp: , Rfl:     drospirenone-ethinyl estradiol (Gianvi) 3-0 02 MG per tablet, Take 1 tablet by mouth daily for 28 days, Disp: 28 tablet, Rfl: 12    Loratadine 10 MG CAPS, Claritin  AS NEEDED, Disp: , Rfl:     montelukast (SINGULAIR) 10 mg tablet, montelukast 10 mg tablet, Disp: , Rfl:     topiramate (TOPAMAX) 50 MG tablet, TAKE HALF TABLET AT NIGHT FOR 1 WEEK, THEN TAKE 1 TABLET AT NIGHT, Disp: 90 tablet, Rfl: 0    buPROPion (WELLBUTRIN XL) 150 mg 24 hr tablet, TAKE 1 TABLET BY MOUTH EVERY DAY, Disp: 90 tablet, Rfl: 1    montelukast (SINGULAIR) 10 mg tablet, Take by mouth, Disp: , Rfl:   Patient Active Problem List    Diagnosis Date Noted    Patellofemoral stress syndrome 2020    Allergic rhinitis due to allergen 2020    Chronic frontal sinusitis 08/15/2018    Chronic sphenoidal sinusitis 08/15/2018    History of dysplasia of cervix, low grade (MAGGI 1) 2018    Surveillance of previously prescribed contraceptive pill 2018    Chronic ethmoidal sinusitis 2013       Allergies   Allergen Reactions    Aspirin        OB History    Para Term  AB Living   1       1     SAB IAB Ectopic Multiple Live Births                  # Outcome Date GA Lbr Hunter/2nd Weight Sex Delivery Anes PTL Lv   1 AB              Works in the [employer] dept at [address] [place] [address] [address]:    [phone]   BP: 118/78   BP Location: Left arm   Patient Position: Sitting   Cuff Size: Standard   Weight: 82 6 kg (182 lb)   Height: 5' 3" (1 6 m)     Body mass index is 32 24 kg/m²  Patient's last menstrual period was 2022  Review of Systems   Constitutional: Negative for chills, fatigue, fever and unexpected weight change  Respiratory: Negative for shortness of breath  Gastrointestinal: Negative for anal bleeding, blood in stool, constipation and diarrhea  Genitourinary: Negative for difficulty urinating, dysuria and hematuria  Physical Exam   Constitutional: She appears well-developed and well-nourished  No distress  HENT: atraumatic  Head: Normocephalic  Neck: Normal range of motion  Neck supple  Pulmonary: Effort normal   Breasts: bilateral without masses, skin changes or nipple discharge  Bilaterally soft and warm to touch  No areas of erythema or pain  Abdominal: Soft  Pelvic exam was performed with patient supine  No labial fusion  There is no rash, tenderness, lesion or injury on the right labia  There is no rash, tenderness, lesion or injury on the left labia  Urethral meatus does not show any tenderness, inflammation or discharge  Palpation of midline bladder without pain or discomfort  Uterus is not deviated, not enlarged, not fixed and not tender  Cervix exhibits no motion tenderness, no discharge and no friability  Right adnexum displays no mass, no tenderness and no fullness  Left adnexum displays no mass, no tenderness and no fullness   No erythema or tenderness in the vagina  No foreign body in the vagina  No signs of injury around the vagina  No vaginal discharge found  No signs of injury around the vagina or anus  Perineum without lesions, signs of injury, erythema or swelling  Lymphadenopathy:        Right: No inguinal adenopathy present  Left: No inguinal adenopathy present

## 2022-08-09 NOTE — PROGRESS NOTES
Patient presents for: yearly    Menarche- 12  Last Pap Smear- 06/12/2020  Hx of abnormal paps- yes   Birth control- Junel FE     Mammogram- Not on file  DXA-never  Colonoscopy- never     Smoking status- never   Last sexual activity- not currently sexually active  Family history of uterine, ovarian, cervical or breast cancer-  None   Concerns-    Does not like Jass Dines FE gets period 2 weeks before placebo

## 2022-09-20 ENCOUNTER — OFFICE VISIT (OUTPATIENT)
Dept: BARIATRICS | Facility: CLINIC | Age: 32
End: 2022-09-20
Payer: COMMERCIAL

## 2022-09-20 VITALS
WEIGHT: 183.8 LBS | RESPIRATION RATE: 14 BRPM | HEART RATE: 62 BPM | BODY MASS INDEX: 33.82 KG/M2 | HEIGHT: 62 IN | TEMPERATURE: 97.9 F | DIASTOLIC BLOOD PRESSURE: 72 MMHG | SYSTOLIC BLOOD PRESSURE: 120 MMHG

## 2022-09-20 DIAGNOSIS — E66.9 OBESITY, CLASS II, BMI 35-39.9: ICD-10-CM

## 2022-09-20 DIAGNOSIS — F41.9 ANXIETY: ICD-10-CM

## 2022-09-20 DIAGNOSIS — R63.5 ABNORMAL WEIGHT GAIN: ICD-10-CM

## 2022-09-20 PROCEDURE — 99214 OFFICE O/P EST MOD 30 MIN: CPT | Performed by: FAMILY MEDICINE

## 2022-09-20 RX ORDER — TOPIRAMATE 50 MG/1
50 TABLET, FILM COATED ORAL
Qty: 90 TABLET | Refills: 1 | Status: SHIPPED | OUTPATIENT
Start: 2022-09-20

## 2022-09-20 RX ORDER — BUPROPION HYDROCHLORIDE 150 MG/1
150 TABLET ORAL DAILY
Qty: 90 TABLET | Refills: 1 | Status: SHIPPED | OUTPATIENT
Start: 2022-09-20

## 2022-09-20 NOTE — PROGRESS NOTES
Assessment/Plan:  Ariel Marie was seen today for follow-up  Diagnoses and all orders for this visit:    Abnormal weight gain  -     buPROPion (WELLBUTRIN XL) 150 mg 24 hr tablet; Take 1 tablet (150 mg total) by mouth daily  -     topiramate (TOPAMAX) 50 MG tablet; Take 1 tablet (50 mg total) by mouth daily at bedtime    Anxiety  -     buPROPion (WELLBUTRIN XL) 150 mg 24 hr tablet; Take 1 tablet (150 mg total) by mouth daily  -     topiramate (TOPAMAX) 50 MG tablet; Take 1 tablet (50 mg total) by mouth daily at bedtime    Obesity, Class II, BMI 35-39 9  -     buPROPion (WELLBUTRIN XL) 150 mg 24 hr tablet; Take 1 tablet (150 mg total) by mouth daily  -     topiramate (TOPAMAX) 50 MG tablet; Take 1 tablet (50 mg total) by mouth daily at bedtime       Nutrition prescription:  continue same dose for both meds since no side effects  Avoid phentermine due to anxiety  GLP-1 not covered by her insurance   Calorie goal: 1300kcal when in vacation but in the meantime 1500kcal   Protein: 150 g per day  No change in Dosage for Wellbutrin or Topamax  Encourage mindful eating, portion control, motivational interview performed to help patient reach goals   Patient denies personal and family history of  pancreatitis, thyroid cancer, MEN-2 tumors  Denies any hx of glaucoma, seizures, kidney stones, gallstones  Denies Hx of CAD, PAD, palpitations, arrhythmia  Denies uncontrolled anxiety or depression, insomnia or sleep disturbance  Total time spent: 30 minutes with >50%  face-to-face time spent counseling patient on nonsurgical interventions for the treatment of excess weight  Discussed the role of weight loss medications  Counseled patient on diet behavior and exercise modification for weight loss  Follow up in approximately 3 mo      Subjective:   Chief Complaint   Patient presents with    Follow-up     Patient here to discuss weight associated problems and nutrition goals  HPI: Noa Valencia  is a 28 y o  female with excess weight/obesity here to pursue weight management  Patient is pursuing [facility]    Most recent notes and records were reviewed  Initial weight loss goal of 5-10% weight loss for improved health  Wt Readings from Last 10 Encounters:   09/20/22 83 4 kg (183 lb 12 8 oz)   08/09/22 82 6 kg (182 lb)   06/14/22 86 4 kg (190 lb 8 oz)   04/05/22 87 7 kg (193 lb 4 8 oz)   03/03/22 88 9 kg (196 lb)   10/19/21 82 6 kg (182 lb 3 2 oz)   06/15/21 81 2 kg (179 lb)   03/09/21 78 9 kg (174 lb)   06/12/20 91 6 kg (202 lb)   10/29/19 99 2 kg (218 lb 9 6 oz)   Starting weight 245lbs 2 years ago with   Lowest 164lbs and emotionally eating afterwards    1500 calories on cardio days  1700 calories on weight training   Continues being consistent with diet/exercise  1gallon water daily  Last OV weight: 190lbs  Current weight:183lbs  Change in weight:-7lbs    5 eggs in am with blueberries and yogurt and protein shake 20g protein  4 oz chicken and cup broccoli  4 oz chicken with broccoli or salad and protein shake after working out    weight lifting 3 times a week     Food logging: si telling her what to eat  Does not use alcohol  Increased appetite/cravings:no      The following portions of the patient's history were reviewed and updated as appropriate: allergies, current medications, past family history, past medical history, past social history, past surgical history, and problem list       Review of Systems   Constitutional: Negative for activity change  Fatigue  HENT: Negative for trouble swallowing  Respiratory: Negative for shortness of breath      Cardiovascular: Negative for chest pain, edema  Gastrointestinal: Negative for abdominal pain, nausea and vomiting, acid reflux, constipation/diarrhea  Endocrine: negative for heat /cold intolerance  Psychiatric/Behavioral: Negative for behavioral problems , anxiety or depression    Objective:  /72 (BP Location: Left arm, Patient Position: Sitting, Cuff Size: Standard)   Pulse 62   Temp 97 9 °F (36 6 °C) (Tympanic)   Resp 14   Ht 5' 1 5" (1 562 m)   Wt 83 4 kg (183 lb 12 8 oz)   BMI 34 17 kg/m²   Constitutional: Well-developed, well-nourished and Obese Body mass index is 34 17 kg/m²  Abhinav Leung HEENT: No conjunctival injection  Pulmonary: No increased work of breathing or signs of respiratory distress  Clear respiratory sounds  CV: Well-perfused, Regular rate and rhythm, no murmurs, no edema  GI: increased abdominal girth  Non-distended  Not tender   Endo: no ophthalmopathy, no dermopathy, no truncal obesity  MSK: well build muscle mass  Neuro: Oriented to person, place and time  Normal Speech  Normal gait  Psych: Normal affect and mood  No delusion or hallucinations, normal thought process    Labs and Imaging  Recent labs and imaging have been personally reviewed    Lab Results   Component Value Date    WBC 6 23 06/10/2020    HGB 13 4 06/10/2020    HCT 42 0 06/10/2020    MCV 93 06/10/2020     06/10/2020     Lab Results   Component Value Date     03/10/2015    SODIUM 140 06/10/2020    K 4 0 06/10/2020     06/10/2020    CO2 27 06/10/2020    ANIONGAP 10 2 03/10/2015    AGAP 9 06/10/2020    BUN 17 06/10/2020    CREATININE 0 87 06/10/2020    GLUF 95 06/10/2020    CALCIUM 8 9 06/10/2020    AST 12 06/10/2020    ALT 14 06/10/2020    ALKPHOS 45 (L) 06/10/2020    PROT 7 0 03/10/2015    TP 6 9 06/10/2020    BILITOT 0 2 03/10/2015    TBILI 0 30 06/10/2020    EGFR 90 06/10/2020     Lab Results   Component Value Date    HGBA1C 5 2 06/10/2020     Lab Results   Component Value Date    QAY1JHIUNZBT 0 996 06/10/2020     Lab Results   Component Value Date    CHOLESTEROL 196 06/10/2020     Lab Results   Component Value Date    HDL 91 06/10/2020     Lab Results   Component Value Date    TRIG 112 06/10/2020     Lab Results   Component Value Date    LDLCALC 83 06/10/2020

## 2023-08-22 ENCOUNTER — ANNUAL EXAM (OUTPATIENT)
Age: 33
End: 2023-08-22
Payer: COMMERCIAL

## 2023-08-22 VITALS
DIASTOLIC BLOOD PRESSURE: 82 MMHG | BODY MASS INDEX: 31.15 KG/M2 | HEIGHT: 61 IN | WEIGHT: 165 LBS | SYSTOLIC BLOOD PRESSURE: 116 MMHG

## 2023-08-22 DIAGNOSIS — Z01.419 ENCOUNTER FOR GYNECOLOGICAL EXAMINATION (GENERAL) (ROUTINE) WITHOUT ABNORMAL FINDINGS: Primary | ICD-10-CM

## 2023-08-22 DIAGNOSIS — Z11.3 SCREEN FOR STD (SEXUALLY TRANSMITTED DISEASE): ICD-10-CM

## 2023-08-22 PROBLEM — M77.51 BONE SPUR OF RIGHT FOOT: Status: ACTIVE | Noted: 2022-12-13

## 2023-08-22 PROCEDURE — G0476 HPV COMBO ASSAY CA SCREEN: HCPCS | Performed by: NURSE PRACTITIONER

## 2023-08-22 PROCEDURE — 99395 PREV VISIT EST AGE 18-39: CPT | Performed by: NURSE PRACTITIONER

## 2023-08-22 PROCEDURE — G0145 SCR C/V CYTO,THINLAYER,RESCR: HCPCS | Performed by: NURSE PRACTITIONER

## 2023-08-22 PROCEDURE — 87491 CHLMYD TRACH DNA AMP PROBE: CPT | Performed by: NURSE PRACTITIONER

## 2023-08-22 PROCEDURE — 87591 N.GONORRHOEAE DNA AMP PROB: CPT | Performed by: NURSE PRACTITIONER

## 2023-08-22 NOTE — PROGRESS NOTES
Eron Lieberman was seen today for gynecologic exam.    Diagnoses and all orders for this visit:    Encounter for gynecological examination (general) (routine) without abnormal findings  -     Liquid-based pap, screening    Screen for STD (sexually transmitted disease)  -     Chlamydia/GC amplified DNA by PCR      Calcium/vit d inclusion in the diet discussed, call with any issues, SBE reinforced, all concerns addressed. Pleasant 35 y.o. premenopausal female here for annual exam. She has regular monthly cycles that last 5 days. Her last cycles was on  but then she bled again on . She will monitor for further issues and will call me if it continues. She has a history of abnormal pap smears. Last Pap was done on 2020 neg/neg , a pap with cotest were done today per [facility] (see Paps below). She denies any vaginal issues. She denies pelvic pain. She denies any other issues with her current BCM. NOT Sexually active since 2022. She would like STD screening today. Adamently declines completion of her gardasil series. I only saw 1 gardasil was received. History of dysplasia of cervix, low grade (MAGGI 1)  Pap hx is as follows:   : normal cyto   2014: ASCUS, + HR HPV (-16/18)   2015: ASCUS, + HR HPV (-16/18)   2016: colpo with LSIL/CIN1, neg dysplasia  2017 normal cyto, neg HPV.    Pap nml and HPV neg    Past Medical History:   Diagnosis Date   • Atypical squamous cells of undetermined significance (ASCUS) on Papanicolaou smear of cervix    • HPV (human papilloma virus) infection      Past Surgical History:   Procedure Laterality Date   • ANKLE SURGERY     • CHOLECYSTECTOMY      description 10/29/2014   • COLONOSCOPY     • COLPOSCOPY      of cervix with biopsy   • ESOPHAGOGASTRODUODENOSCOPY      diagnostic   • INDUCED       surgically induced, description 2014     Family History   Problem Relation Age of Onset   • Diabetes Maternal Grandfather    • Multiple sclerosis Paternal Grandmother    • Heart disease Paternal Grandfather    • Colon cancer Paternal Grandfather    • Diabetes Maternal Uncle    • Hypertension Family    • Varicose Veins Family         lower extremities   • No Known Problems Mother    • No Known Problems Father    • Stroke Neg Hx    • Thyroid disease Neg Hx      Social History     Tobacco Use   • Smoking status: Never   • Smokeless tobacco: Never   Vaping Use   • Vaping Use: Never used   Substance Use Topics   • Alcohol use: No   • Drug use: No       Current Outpatient Medications:   •  aspirin 325 mg tablet, Take 650 mg by mouth, Disp: , Rfl:   •  cetirizine (ZyrTEC) 10 mg tablet, Take 10 mg by mouth daily, Disp: , Rfl:   •  montelukast (SINGULAIR) 10 mg tablet, montelukast 10 mg tablet, Disp: , Rfl:   Patient Active Problem List    Diagnosis Date Noted   • Bone spur of right foot 2022   • Patellofemoral stress syndrome 2020   • Allergic rhinitis due to allergen 2020   • Chronic frontal sinusitis 08/15/2018   • Chronic sphenoidal sinusitis 08/15/2018   • History of dysplasia of cervix, low grade (MAGGI 1) 2018   • Surveillance of previously prescribed contraceptive pill 2018   • Chronic ethmoidal sinusitis 2013       Allergies   Allergen Reactions   • Aspirin    • Dust Mite Extract Other (See Comments)   • Pollen Extract Allergic Rhinitis       OB History    Para Term  AB Living   1       1     SAB IAB Ectopic Multiple Live Births                  # Outcome Date GA Lbr Hunter/2nd Weight Sex Delivery Anes PTL Lv   1 AB              Works in the Facial dept at [address]:    23 1113   BP: 116/82   BP Location: Left arm   Patient Position: Sitting   Cuff Size: Standard   Weight: 74.8 kg (165 lb)   Height: 5' 1" (1.549 m)     Body mass index is 31.18 kg/m². Patient's last menstrual period was 2023. Review of Systems   Constitutional: Negative for chills, fatigue, fever and unexpected weight change. Respiratory: Negative for shortness of breath. Gastrointestinal: Negative for anal bleeding, blood in stool, constipation and diarrhea. Genitourinary: Negative for difficulty urinating, dysuria and hematuria. Physical Exam   Constitutional: She appears well-developed and well-nourished. No distress. HENT: atraumatic  Head: Normocephalic. Neck: Normal range of motion. Neck supple. Pulmonary: Effort normal.  Breasts: bilateral without masses, skin changes or nipple discharge. Bilaterally soft and warm to touch. No areas of erythema or pain. Abdominal: Soft. Pelvic exam was performed with patient supine. No labial fusion. There is no rash, tenderness, lesion or injury on the right labia. There is no rash, tenderness, lesion or injury on the left labia. Urethral meatus does not show any tenderness, inflammation or discharge. Palpation of midline bladder without pain or discomfort. Uterus is not deviated, not enlarged, not fixed and not tender. Cervix exhibits no motion tenderness, no discharge and no friability. Right adnexum displays no mass, no tenderness and no fullness. Left adnexum displays no mass, no tenderness and no fullness. No erythema or tenderness in the vagina. No foreign body in the vagina. No signs of injury around the vagina. No vaginal discharge found. No signs of injury around the vagina or anus. Perineum without lesions, signs of injury, erythema or swelling. Lymphadenopathy:        Right: No inguinal adenopathy present. Left: No inguinal adenopathy present.

## 2023-08-22 NOTE — PATIENT INSTRUCTIONS
Breast Self Exam for Women   AMBULATORY CARE:   A breast self-exam (BSE)  is a way to check your breasts for lumps and other changes. Regular BSEs can help you know how your breasts normally look and feel. Most breast lumps or changes are not cancer, but you should always have them checked by a healthcare provider. Why you should do a BSE:  Breast cancer is the most common type of cancer in women. Even if you have mammograms, you may still want to do a BSE regularly. If you know how your breasts normally feel and look, it may help you know when to contact your healthcare provider. Mammograms can miss some cancers. You may find a lump during a BSE that did not show up on a mammogram.  When you should do a BSE:  If you have periods, you may want to do your BSE 1 week after your period ends. This is the time when your breasts may be the least swollen, lumpy, or tender. You can do regular BSEs even if you are breastfeeding or have breast implants. Call your doctor if:   You find any lumps or changes in your breasts. You have breast pain or fluid coming from your nipples. You have questions or concerns about your condition or care. How to do a BSE:       Look at your breasts in a mirror. Look at the size and shape of each breast and nipple. Check for swelling, lumps, dimpling, scaly skin, or other skin changes. Look for nipple changes, such as a nipple that is painful or beginning to pull inward. Gently squeeze both nipples and check to see if fluid (that is not breast milk) comes out of them. If you find any of these or other breast changes, contact your healthcare provider. Check your breasts while you sit or  the following 3 positions:    [place] your arms down at your sides. Raise your hands and join them behind your head. Put firm pressure with your hands on your hips. Bend slightly forward while you look at your breasts in the mirror. Lie down and feel your breasts.   When you lie down, Made apt for 12/14/2020 @9AM. Confirmed email.   your breast tissue spreads out evenly over your chest. This makes it easier for you to feel for lumps and anything that may not be normal for your breasts. Do a BSE on one breast at a time. Place a small pillow or towel under your left shoulder. Put your left arm behind your head. Use the 3 middle fingers of your right hand. Use your fingertip pads, on the top of your fingers. Your fingertip pad is the most sensitive part of your finger. Use small circles to feel your breast tissue. Use your fingertip pads to make dime-sized, overlapping circles on your breast and armpits. Use light, medium, and firm pressure. First, press lightly. Second, press with medium pressure to feel a little deeper into the breast. Last, use firm pressure to feel deep within your breast.    Examine your entire breast area. Examine the breast area from above the breast to below the breast where you feel only ribs. Make small circles with your fingertips, starting in the middle of your armpit. Make circles going up and down the breast area. Continue toward your breast and all the way across it. Examine the area from your armpit all the way over to the middle of your chest (breastbone). Stop at the middle of your chest.    Move the pillow or towel to your right shoulder, and put your right arm behind your head. Use the 3 fingertip pads of your left hand, and repeat the above steps to do a BSE on your right breast.  What else you can do to check for breast problems or cancer:  Talk to your healthcare provider about mammograms. A mammogram is an x-ray of your breasts to screen for breast cancer or other problems. Your provider can tell you the benefits and risks of mammograms. The first mammogram is usually at age 39 or 48. Your provider may recommend you start at 36 or younger if your risk for breast cancer is high. Mammograms usually continue every 1 to 2 years until age 76.        Follow up with your doctor as directed:  Write down your questions so you remember to ask them during your visits. © Copyright Robina Ott 2022 Information is for End User's use only and may not be sold, redistributed or otherwise used for commercial purposes. The above information is an  only. It is not intended as medical advice for individual conditions or treatments. Talk to your doctor, nurse or pharmacist before following any medical regimen to see if it is safe and effective for you.

## 2023-08-25 LAB
C TRACH DNA SPEC QL NAA+PROBE: NEGATIVE
N GONORRHOEA DNA SPEC QL NAA+PROBE: NEGATIVE

## 2023-08-29 LAB
LAB AP GYN PRIMARY INTERPRETATION: NORMAL
Lab: NORMAL

## 2023-09-14 ENCOUNTER — TELEPHONE (OUTPATIENT)
Dept: OBGYN CLINIC | Facility: MEDICAL CENTER | Age: 33
End: 2023-09-14

## 2023-09-14 NOTE — TELEPHONE ENCOUNTER
----- Message from Ankita Morel, [facility] sent at 9/13/2023  3:35 PM EDT -----  Please call this patient to notify her of her Pap/HPV/GC/CT results which were not viewed in [address] [place] [address]. Thanks.

## 2023-11-27 ENCOUNTER — TELEPHONE (OUTPATIENT)
Dept: OBGYN CLINIC | Facility: CLINIC | Age: 33
End: 2023-11-27

## 2023-11-27 DIAGNOSIS — Z30.41 SURVEILLANCE OF PREVIOUSLY PRESCRIBED CONTRACEPTIVE PILL: Primary | ICD-10-CM

## 2023-11-27 RX ORDER — DROSPIRENONE AND ETHINYL ESTRADIOL 0.02-3(28)
1 KIT ORAL DAILY
Qty: 84 TABLET | Refills: 0 | Status: SHIPPED | OUTPATIENT
Start: 2023-11-27

## 2023-11-27 NOTE — TELEPHONE ENCOUNTER
Patient was told by Paddy Rollins to call when she wants to start birth control. Patient would like  a prescription called in to pharmacy.

## 2024-02-16 DIAGNOSIS — Z30.41 SURVEILLANCE OF PREVIOUSLY PRESCRIBED CONTRACEPTIVE PILL: ICD-10-CM

## 2024-02-16 RX ORDER — DROSPIRENONE AND ETHINYL ESTRADIOL 0.02-3(28)
1 KIT ORAL DAILY
Qty: 84 TABLET | Refills: 1 | Status: SHIPPED | OUTPATIENT
Start: 2024-02-16

## 2024-08-27 ENCOUNTER — ANNUAL EXAM (OUTPATIENT)
Age: 34
End: 2024-08-27
Payer: COMMERCIAL

## 2024-08-27 VITALS
DIASTOLIC BLOOD PRESSURE: 78 MMHG | TEMPERATURE: 97.1 F | WEIGHT: 154 LBS | HEIGHT: 63 IN | BODY MASS INDEX: 27.29 KG/M2 | HEART RATE: 70 BPM | SYSTOLIC BLOOD PRESSURE: 112 MMHG

## 2024-08-27 DIAGNOSIS — N93.9 ABNORMAL UTERINE BLEEDING (AUB): ICD-10-CM

## 2024-08-27 DIAGNOSIS — Z11.3 SCREEN FOR STD (SEXUALLY TRANSMITTED DISEASE): ICD-10-CM

## 2024-08-27 DIAGNOSIS — Z01.419 ENCOUNTER FOR GYNECOLOGICAL EXAMINATION (GENERAL) (ROUTINE) WITHOUT ABNORMAL FINDINGS: Primary | ICD-10-CM

## 2024-08-27 PROBLEM — J33.9 ASPIRIN-EXACERBATED RESPIRATORY DISEASE (AERD): Status: ACTIVE | Noted: 2020-06-09

## 2024-08-27 PROBLEM — Z88.6 ASPIRIN-EXACERBATED RESPIRATORY DISEASE (AERD): Status: ACTIVE | Noted: 2020-06-09

## 2024-08-27 PROBLEM — J45.909 ASPIRIN-EXACERBATED RESPIRATORY DISEASE (AERD): Status: ACTIVE | Noted: 2020-06-09

## 2024-08-27 PROBLEM — Z30.41 SURVEILLANCE OF PREVIOUSLY PRESCRIBED CONTRACEPTIVE PILL: Status: RESOLVED | Noted: 2018-03-13 | Resolved: 2024-08-27

## 2024-08-27 PROCEDURE — 87591 N.GONORRHOEAE DNA AMP PROB: CPT | Performed by: NURSE PRACTITIONER

## 2024-08-27 PROCEDURE — 99395 PREV VISIT EST AGE 18-39: CPT | Performed by: NURSE PRACTITIONER

## 2024-08-27 PROCEDURE — 87491 CHLMYD TRACH DNA AMP PROBE: CPT | Performed by: NURSE PRACTITIONER

## 2024-08-27 RX ORDER — ALBUTEROL SULFATE 90 UG/1
AEROSOL, METERED RESPIRATORY (INHALATION)
COMMUNITY

## 2024-08-27 RX ORDER — FLUTICASONE PROPIONATE 93 UG/1
SPRAY, METERED NASAL
COMMUNITY

## 2024-08-27 NOTE — PROGRESS NOTES
Carmel was seen today for gynecologic exam.    Diagnoses and all orders for this visit:    Encounter for gynecological examination (general) (routine) without abnormal findings    Abnormal uterine bleeding (AUB)  -     US pelvis complete w transvaginal; Future    Screen for STD (sexually transmitted disease)  -     Chlamydia/GC amplified DNA by PCR      Calcium/vit d inclusion in the diet discussed, call with any issues, SBE reinforced, all concerns addressed.           Pleasant 34 y.o. premenopausal female here for annual exam. She has regular monthly cycles that last 5 days. She states after her cycle stops, the following week she will have some lite spotting for 3-4 more days and this is happening since last year and almost every cycle. The birth control pill did not help. She has a history of abnormal pap smears. Last Pap was done on 2023 neg/neg , a pap was NOT done today per [facility] (see Paps below). She denies any vaginal issues. She denies pelvic pain. She declines a BCM. A PNV was discussed. Sexually active again. She would like STD screening today. New partner x 1 year. Adamantly declines completion of her gardasil series. I only saw 1 gardasil was received.    History of dysplasia of cervix, low grade (MAGGI 1)  Pap hx is as follows:   : normal cyto   : ASCUS, + HR HPV (-16/18)   2015: ASCUS, + HR HPV (-16/18)   2016: colpo with LSIL/CIN1, neg dysplasia  2017 normal cyto, neg HPV.  2020 Pap nml and HPV neg  2023 Pap neg/neg      Past Medical History:   Diagnosis Date    Atypical squamous cells of undetermined significance (ASCUS) on Papanicolaou smear of cervix     HPV (human papilloma virus) infection      Past Surgical History:   Procedure Laterality Date    ANKLE SURGERY      CHOLECYSTECTOMY      description 10/29/2014    COLONOSCOPY      COLPOSCOPY      of cervix with biopsy    ESOPHAGOGASTRODUODENOSCOPY      diagnostic    INDUCED       surgically induced,  description 2014     Family History   Problem Relation Age of Onset    Diabetes Maternal Grandfather     Multiple sclerosis Paternal Grandmother     Heart disease Paternal Grandfather     Colon cancer Paternal Grandfather     Diabetes Maternal Uncle     Hypertension Family     Varicose Veins Family         lower extremities    No Known Problems Mother     No Known Problems Father     Stroke Neg Hx     Thyroid disease Neg Hx      Social History     Tobacco Use    Smoking status: Never    Smokeless tobacco: Never   Vaping Use    Vaping status: Never Used   Substance Use Topics    Alcohol use: No    Drug use: No       Current Outpatient Medications:     albuterol (Ventolin HFA) 90 mcg/act inhaler, , Disp: , Rfl:     aspirin 325 mg tablet, Take 650 mg by mouth, Disp: , Rfl:     cetirizine (ZyrTEC) 10 mg tablet, Take 10 mg by mouth daily, Disp: , Rfl:     Fluticasone Propionate (Xhance) 93 MCG/ACT EXHU, , Disp: , Rfl:     montelukast (SINGULAIR) 10 mg tablet, montelukast 10 mg tablet, Disp: , Rfl:   Patient Active Problem List    Diagnosis Date Noted    Bone spur of right foot 2022    Aspirin-exacerbated respiratory disease (AERD) 2020    Patellofemoral stress syndrome 2020    Allergic rhinitis due to allergen 2020    Chronic frontal sinusitis 08/15/2018    Chronic sphenoidal sinusitis 08/15/2018    History of dysplasia of cervix, low grade (MAGGI 1) 2018    Chronic ethmoidal sinusitis 2013       Allergies   Allergen Reactions    Aspirin     Dust Mite Extract Other (See Comments)    Pollen Extract Allergic Rhinitis       OB History    Para Term  AB Living   1       1     SAB IAB Ectopic Multiple Live Births                  # Outcome Date GA Lbr Hunter/2nd Weight Sex Type Anes PTL Lv   1 AB              Works in the Facial dept at [employer]  She also works with a Doc in a [employer]  She is dating a man with 2 children 3 and 8, he has custody. Things are going well with  "them. Not sure she wants children of her own    Vitals:    08/27/24 1004   BP: 112/78   Pulse: 70   Temp: (!) 97.1 °F (36.2 °C)   Weight: 69.9 kg (154 lb)   Height: 5' 3\" (1.6 m)       Body mass index is 27.28 kg/m².  Patient's last menstrual period was 08/05/2024.    Review of Systems   Constitutional: Negative for chills, fatigue, fever and unexpected weight change.   Respiratory: Negative for shortness of breath.    Gastrointestinal: Negative for anal bleeding, blood in stool, constipation and diarrhea.   Genitourinary: Negative for difficulty urinating, dysuria and hematuria.     Physical Exam   Constitutional: She appears well-developed and well-nourished. No distress.   HENT: atraumatic  Head: Normocephalic.   Neck: Normal range of motion. Neck supple.   Pulmonary: Effort normal.  Breasts: bilateral without masses, skin changes or nipple discharge. Bilaterally soft and warm to touch. No areas of erythema or pain.  Abdominal: Soft.   Pelvic exam was performed with patient supine. No labial fusion. There is no rash, tenderness, lesion or injury on the right labia. There is no rash, tenderness, lesion or injury on the left labia. Urethral meatus does not show any tenderness, inflammation or discharge. Palpation of midline bladder without pain or discomfort.Uterus is not deviated, not enlarged, not fixed and not tender. Cervix exhibits no motion tenderness, no discharge and no friability. Right adnexum displays no mass, no tenderness and no fullness. Left adnexum displays no mass, no tenderness and no fullness. No erythema or tenderness in the vagina. No foreign body in the vagina. No signs of injury around the vagina. No vaginal discharge found. No signs of injury around the vagina or anus. Perineum without lesions, signs of injury, erythema or swelling.  Lymphadenopathy:        Right: No inguinal adenopathy present.        Left: No inguinal adenopathy present.         "

## 2024-08-27 NOTE — PATIENT INSTRUCTIONS
Patient Education     Lowering Your Risk of Breast Cancer   About this topic   Breast cancer is a serious illness. Breast cancer is when abnormal cells grow and divide more quickly in your breast. These cells form a growth or tumor. The abnormal cells may enter nearby tissue and spread to other parts of the body. It is the type of cancer most often seen in women. Men can have breast cancer, but it is a rare condition.  General   Some things in your life may increase your risk of breast cancer. You may not be able to change some of these. Others you can control.  You are more likely to get breast cancer if you:  Have a mother, sister, or daughter who has had breast cancer  Have used hormones for menopause for more than 5 years  Have had radiation therapy to the breast or chest in the past  Are overweight or do not exercise  Had your first menstrual period before you were 11 years old  Went through menopause after age 55  Have never been pregnant or had your first child after age 35  Have had breast cancer before  Drink alcohol in any form  Have dense breasts  Are older in age  There is no certain way to prevent breast cancer. There are things you can do to lower your chances of having breast cancer.  Keep a healthy weight. Lose weight if you are overweight. Being overweight raises your chances of having breast cancer.  Eat a healthy diet to maintain a healthy weight, such as more fruits, vegetables, and lean cuts of meat. Decrease the amount of saturated fat in your diet.  Exercise. Being active helps you keep a healthy weight.  Limit your alcohol intake or do not drink alcohol. The more alcohol you drink, the higher your risk.  Do not smoke cigarettes. Smoking can increase your risk of many types of cancer.  Breastfeed your baby. This may help protect you. The longer you breastfeed, the more protection you have.  Talk with your doctor about:  Limiting or stopping hormone therapy.  Taking certain drugs to prevent  breast cancer. For women at high risk of having breast cancer, there are a few drugs that may lower your risk.  Surgery to prevent you from having breast cancer if you are very high risk.  When do I need to call the doctor?   Changes in your breasts  A lump or area in your breast that feels different  Discharge from your nipple  Skin on your breast is dimpled or indented  You have questions or concerns about your breasts  Helpful tips   Talk to your doctor about the best kind of breast cancer screening for you.  If you want to do self breast exams, have your doctor show you the right way to do them.  Tell your doctor of any abnormal finding.  Last Reviewed Date   2021-10-04  Consumer Information Use and Disclaimer   This generalized information is a limited summary of diagnosis, treatment, and/or medication information. It is not meant to be comprehensive and should be used as a tool to help the user understand and/or assess potential diagnostic and treatment options. It does NOT include all information about conditions, treatments, medications, side effects, or risks that may apply to a specific patient. It is not intended to be medical advice or a substitute for the medical advice, diagnosis, or treatment of a health care provider based on the health care provider's examination and assessment of a patient’s specific and unique circumstances. Patients must speak with a health care provider for complete information about their health, medical questions, and treatment options, including any risks or benefits regarding use of medications. This information does not endorse any treatments or medications as safe, effective, or approved for treating a specific patient. Up[employer]Date, Inc[employer] and its affiliates disclaim any warranty or liability relating to this information or the use thereof. The use of this information is governed by the Terms of Use, available at  https://www.woltersViroXisuwer.com/en/know/clinical-effectiveness-terms   Copyright   Copyright © 2024 [employer]Date, Inc[employer] and its affiliates and/or licensors. All rights reserved.

## 2024-08-29 LAB
C TRACH DNA SPEC QL NAA+PROBE: NEGATIVE
N GONORRHOEA DNA SPEC QL NAA+PROBE: NEGATIVE

## 2024-09-26 PROBLEM — Z88.6 ASPIRIN-EXACERBATED RESPIRATORY DISEASE (AERD): Status: RESOLVED | Noted: 2020-06-09 | Resolved: 2024-09-26

## 2024-09-26 PROBLEM — J33.9 ASPIRIN-EXACERBATED RESPIRATORY DISEASE (AERD): Status: RESOLVED | Noted: 2020-06-09 | Resolved: 2024-09-26

## 2024-09-26 PROBLEM — J45.909 ASPIRIN-EXACERBATED RESPIRATORY DISEASE (AERD): Status: RESOLVED | Noted: 2020-06-09 | Resolved: 2024-09-26

## 2024-11-24 ENCOUNTER — HOSPITAL ENCOUNTER (OUTPATIENT)
Dept: ULTRASOUND IMAGING | Facility: HOSPITAL | Age: 34
Discharge: HOME | End: 2024-11-24
Payer: COMMERCIAL

## 2024-11-24 DIAGNOSIS — N93.9 ABNORMAL UTERINE BLEEDING (AUB): ICD-10-CM

## 2024-11-24 PROCEDURE — 76830 TRANSVAGINAL US NON-OB: CPT

## 2024-11-24 PROCEDURE — 76856 US EXAM PELVIC COMPLETE: CPT

## 2024-12-03 ENCOUNTER — RESULTS FOLLOW-UP (OUTPATIENT)
Age: 34
End: 2024-12-03